# Patient Record
Sex: FEMALE | Race: WHITE | Employment: STUDENT | URBAN - NONMETROPOLITAN AREA
[De-identification: names, ages, dates, MRNs, and addresses within clinical notes are randomized per-mention and may not be internally consistent; named-entity substitution may affect disease eponyms.]

---
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
## 2022-11-14 ENCOUNTER — HOSPITAL ENCOUNTER (EMERGENCY)
Facility: HOSPITAL | Age: 22
End: 2022-11-15
Attending: EMERGENCY MEDICINE

## 2022-11-14 DIAGNOSIS — R45.851 SUICIDAL IDEATION: Primary | ICD-10-CM

## 2022-11-14 DIAGNOSIS — F32.A DEPRESSION, UNSPECIFIED DEPRESSION TYPE: ICD-10-CM

## 2022-11-14 LAB
ALBUMIN SERPL BCP-MCNC: 3.8 G/DL (ref 3.5–5)
ALP SERPL-CCNC: 91 U/L (ref 46–116)
ALT SERPL W P-5'-P-CCNC: 21 U/L (ref 12–78)
AMPHETAMINES SERPL QL SCN: NEGATIVE
ANION GAP SERPL CALCULATED.3IONS-SCNC: 6 MMOL/L (ref 4–13)
AST SERPL W P-5'-P-CCNC: 14 U/L (ref 5–45)
BARBITURATES UR QL: NEGATIVE
BASOPHILS # BLD AUTO: 0.05 THOUSANDS/ÂΜL (ref 0–0.1)
BASOPHILS NFR BLD AUTO: 0 % (ref 0–1)
BENZODIAZ UR QL: POSITIVE
BILIRUB SERPL-MCNC: 0.23 MG/DL (ref 0.2–1)
BUN SERPL-MCNC: 15 MG/DL (ref 5–25)
CALCIUM SERPL-MCNC: 9.4 MG/DL (ref 8.3–10.1)
CHLORIDE SERPL-SCNC: 104 MMOL/L (ref 96–108)
CO2 SERPL-SCNC: 30 MMOL/L (ref 21–32)
COCAINE UR QL: NEGATIVE
CREAT SERPL-MCNC: 0.82 MG/DL (ref 0.6–1.3)
EOSINOPHIL # BLD AUTO: 0.18 THOUSAND/ÂΜL (ref 0–0.61)
EOSINOPHIL NFR BLD AUTO: 2 % (ref 0–6)
ERYTHROCYTE [DISTWIDTH] IN BLOOD BY AUTOMATED COUNT: 14.3 % (ref 11.6–15.1)
ETHANOL SERPL-MCNC: <3 MG/DL (ref 0–3)
FLUAV RNA RESP QL NAA+PROBE: NEGATIVE
FLUBV RNA RESP QL NAA+PROBE: NEGATIVE
GFR SERPL CREATININE-BSD FRML MDRD: 103 ML/MIN/1.73SQ M
GLUCOSE SERPL-MCNC: 91 MG/DL (ref 65–140)
HCT VFR BLD AUTO: 38.7 % (ref 34.8–46.1)
HGB BLD-MCNC: 12.6 G/DL (ref 11.5–15.4)
IMM GRANULOCYTES # BLD AUTO: 0.07 THOUSAND/UL (ref 0–0.2)
IMM GRANULOCYTES NFR BLD AUTO: 1 % (ref 0–2)
LYMPHOCYTES # BLD AUTO: 3.45 THOUSANDS/ÂΜL (ref 0.6–4.47)
LYMPHOCYTES NFR BLD AUTO: 29 % (ref 14–44)
MCH RBC QN AUTO: 28.4 PG (ref 26.8–34.3)
MCHC RBC AUTO-ENTMCNC: 32.6 G/DL (ref 31.4–37.4)
MCV RBC AUTO: 87 FL (ref 82–98)
METHADONE UR QL: NEGATIVE
MONOCYTES # BLD AUTO: 0.59 THOUSAND/ÂΜL (ref 0.17–1.22)
MONOCYTES NFR BLD AUTO: 5 % (ref 4–12)
NEUTROPHILS # BLD AUTO: 7.54 THOUSANDS/ÂΜL (ref 1.85–7.62)
NEUTS SEG NFR BLD AUTO: 63 % (ref 43–75)
NRBC BLD AUTO-RTO: 0 /100 WBCS
OPIATES UR QL SCN: NEGATIVE
OXYCODONE+OXYMORPHONE UR QL SCN: NEGATIVE
PCP UR QL: NEGATIVE
PLATELET # BLD AUTO: 362 THOUSANDS/UL (ref 149–390)
PMV BLD AUTO: 8.6 FL (ref 8.9–12.7)
POTASSIUM SERPL-SCNC: 4.3 MMOL/L (ref 3.5–5.3)
PROT SERPL-MCNC: 7.5 G/DL (ref 6.4–8.4)
RBC # BLD AUTO: 4.44 MILLION/UL (ref 3.81–5.12)
RSV RNA RESP QL NAA+PROBE: NEGATIVE
SARS-COV-2 RNA RESP QL NAA+PROBE: NEGATIVE
SODIUM SERPL-SCNC: 140 MMOL/L (ref 135–147)
THC UR QL: POSITIVE
WBC # BLD AUTO: 11.88 THOUSAND/UL (ref 4.31–10.16)

## 2022-11-14 RX ORDER — AMOXICILLIN 250 MG
1 CAPSULE ORAL DAILY PRN
Status: CANCELLED | OUTPATIENT
Start: 2022-11-14

## 2022-11-14 RX ORDER — LANOLIN ALCOHOL/MO/W.PET/CERES
3 CREAM (GRAM) TOPICAL
Status: CANCELLED | OUTPATIENT
Start: 2022-11-14

## 2022-11-14 RX ORDER — BISACODYL 10 MG
10 SUPPOSITORY, RECTAL RECTAL DAILY PRN
Status: CANCELLED | OUTPATIENT
Start: 2022-11-14

## 2022-11-14 RX ORDER — LORAZEPAM 2 MG/ML
2 INJECTION INTRAMUSCULAR EVERY 6 HOURS PRN
Status: CANCELLED | OUTPATIENT
Start: 2022-11-14

## 2022-11-14 RX ORDER — ACETAMINOPHEN 325 MG/1
975 TABLET ORAL EVERY 6 HOURS PRN
Status: CANCELLED | OUTPATIENT
Start: 2022-11-14

## 2022-11-14 RX ORDER — TRAZODONE HYDROCHLORIDE 50 MG/1
50 TABLET ORAL
Status: CANCELLED | OUTPATIENT
Start: 2022-11-14

## 2022-11-14 RX ORDER — HALOPERIDOL 5 MG/ML
5 INJECTION INTRAMUSCULAR
Status: CANCELLED | OUTPATIENT
Start: 2022-11-14

## 2022-11-14 RX ORDER — ACETAMINOPHEN 325 MG/1
650 TABLET ORAL EVERY 4 HOURS PRN
Status: CANCELLED | OUTPATIENT
Start: 2022-11-14

## 2022-11-14 RX ORDER — NICOTINE 21 MG/24HR
14 PATCH, TRANSDERMAL 24 HOURS TRANSDERMAL DAILY
Status: DISCONTINUED | OUTPATIENT
Start: 2022-11-14 | End: 2022-11-15 | Stop reason: HOSPADM

## 2022-11-14 RX ORDER — HALOPERIDOL 1 MG/1
1 TABLET ORAL EVERY 6 HOURS PRN
Status: CANCELLED | OUTPATIENT
Start: 2022-11-14

## 2022-11-14 RX ORDER — ACETAMINOPHEN 325 MG/1
650 TABLET ORAL ONCE
Status: COMPLETED | OUTPATIENT
Start: 2022-11-14 | End: 2022-11-14

## 2022-11-14 RX ORDER — BENZTROPINE MESYLATE 1 MG/1
1 TABLET ORAL
Status: CANCELLED | OUTPATIENT
Start: 2022-11-14

## 2022-11-14 RX ORDER — HALOPERIDOL 5 MG/1
5 TABLET ORAL
Status: CANCELLED | OUTPATIENT
Start: 2022-11-14

## 2022-11-14 RX ORDER — DIPHENHYDRAMINE HYDROCHLORIDE 50 MG/ML
50 INJECTION INTRAMUSCULAR; INTRAVENOUS EVERY 6 HOURS PRN
Status: CANCELLED | OUTPATIENT
Start: 2022-11-14

## 2022-11-14 RX ORDER — HYDROXYZINE HYDROCHLORIDE 25 MG/1
25 TABLET, FILM COATED ORAL
Status: CANCELLED | OUTPATIENT
Start: 2022-11-14

## 2022-11-14 RX ORDER — LORAZEPAM 2 MG/ML
2 INJECTION INTRAMUSCULAR
Status: CANCELLED | OUTPATIENT
Start: 2022-11-14

## 2022-11-14 RX ORDER — HYDROXYZINE HYDROCHLORIDE 25 MG/1
50 TABLET, FILM COATED ORAL
Status: CANCELLED | OUTPATIENT
Start: 2022-11-14

## 2022-11-14 RX ORDER — MAGNESIUM HYDROXIDE/ALUMINUM HYDROXICE/SIMETHICONE 120; 1200; 1200 MG/30ML; MG/30ML; MG/30ML
30 SUSPENSION ORAL EVERY 4 HOURS PRN
Status: CANCELLED | OUTPATIENT
Start: 2022-11-14

## 2022-11-14 RX ORDER — POLYETHYLENE GLYCOL 3350 17 G/17G
17 POWDER, FOR SOLUTION ORAL DAILY PRN
Status: CANCELLED | OUTPATIENT
Start: 2022-11-14

## 2022-11-14 RX ORDER — HYDROXYZINE HYDROCHLORIDE 25 MG/1
100 TABLET, FILM COATED ORAL
Status: CANCELLED | OUTPATIENT
Start: 2022-11-14

## 2022-11-14 RX ORDER — LORAZEPAM 2 MG/ML
1 INJECTION INTRAMUSCULAR
Status: CANCELLED | OUTPATIENT
Start: 2022-11-14

## 2022-11-14 RX ORDER — HALOPERIDOL 5 MG/ML
2.5 INJECTION INTRAMUSCULAR
Status: CANCELLED | OUTPATIENT
Start: 2022-11-14

## 2022-11-14 RX ORDER — HALOPERIDOL 5 MG/1
2.5 TABLET ORAL
Status: CANCELLED | OUTPATIENT
Start: 2022-11-14

## 2022-11-14 RX ORDER — ACETAMINOPHEN 325 MG/1
650 TABLET ORAL EVERY 6 HOURS PRN
Status: CANCELLED | OUTPATIENT
Start: 2022-11-14

## 2022-11-14 RX ORDER — BENZTROPINE MESYLATE 1 MG/ML
0.5 INJECTION INTRAMUSCULAR; INTRAVENOUS
Status: CANCELLED | OUTPATIENT
Start: 2022-11-14

## 2022-11-14 RX ORDER — BENZTROPINE MESYLATE 1 MG/ML
1 INJECTION INTRAMUSCULAR; INTRAVENOUS
Status: CANCELLED | OUTPATIENT
Start: 2022-11-14

## 2022-11-14 RX ADMIN — ACETAMINOPHEN 650 MG: 325 TABLET, FILM COATED ORAL at 16:57

## 2022-11-14 RX ADMIN — NICOTINE 14 MG: 14 PATCH, EXTENDED RELEASE TRANSDERMAL at 18:00

## 2022-11-14 NOTE — ED PROVIDER NOTES
History  Chief Complaint   Patient presents with   • Psychiatric Evaluation     Presents due to anxiety and depression recently, intermittent SI with no plan, previous attempts in last 3 months "cutting self and punching myself ill do anything to myself"     27-year-old female without pertinent history who presents to the emergency department for anxiety and depression recently and intermittent SI with no plan  Reports previous attempts in the last 3 months with cutting herself and punching herself  Patient reports she has not had any SI today but did have SI recently day before yesterday  States that when she does have these thoughts she tries to harm herself  States that she has jumped out of a car in the past and has also banged her head against the wall  Reports a mild headache at this time from hitting herself yesterday  Reports that she has also cut herself in the past but nothing recently  Patient states that she is interested in inpatient psychiatry evaluation and management as she stop using her previous medications  Reports no HI or hallucinations  Denies any physical complaints otherwise  None       History reviewed  No pertinent past medical history  History reviewed  No pertinent surgical history  History reviewed  No pertinent family history  I have reviewed and agree with the history as documented  E-Cigarette/Vaping     E-Cigarette/Vaping Substances   • Nicotine Yes    • Flavoring Yes      Social History     Tobacco Use   • Smoking status: Current Every Day Smoker     Types: Cigarettes   • Smokeless tobacco: Never Used   Substance Use Topics   • Alcohol use: Yes     Comment: social   • Drug use: Yes     Types: Marijuana       Review of Systems   Constitutional: Negative for activity change, appetite change, chills and fever  HENT: Negative for congestion, rhinorrhea and sore throat  Eyes: Negative for visual disturbance     Respiratory: Negative for chest tightness, shortness of breath and wheezing  Cardiovascular: Negative for chest pain, palpitations and leg swelling  Gastrointestinal: Negative for abdominal pain, constipation, diarrhea, nausea and vomiting  Genitourinary: Negative for dysuria, flank pain and pelvic pain  Musculoskeletal: Negative for back pain and neck pain  Skin: Negative for rash  Neurological: Negative for weakness, numbness and headaches  Psychiatric/Behavioral: Positive for self-injury and suicidal ideas (Intermittently but not currently)  Negative for agitation, behavioral problems, hallucinations and sleep disturbance  The patient is not nervous/anxious  Physical Exam  Physical Exam  Vitals and nursing note reviewed  Constitutional:       General: She is not in acute distress  Appearance: She is well-developed  She is not diaphoretic  HENT:      Head: Normocephalic and atraumatic  Right Ear: External ear normal       Left Ear: External ear normal       Nose: Nose normal    Eyes:      Pupils: Pupils are equal, round, and reactive to light  Cardiovascular:      Rate and Rhythm: Normal rate and regular rhythm  Pulses: Normal pulses  Heart sounds: Normal heart sounds  Pulmonary:      Effort: Pulmonary effort is normal  No respiratory distress  Breath sounds: Normal breath sounds  No wheezing or rales  Abdominal:      General: Bowel sounds are normal       Palpations: Abdomen is soft  Tenderness: There is no abdominal tenderness  Musculoskeletal:         General: No tenderness or deformity  Normal range of motion  Cervical back: Normal range of motion and neck supple  Skin:     General: Skin is warm and dry  Capillary Refill: Capillary refill takes less than 2 seconds  Neurological:      Mental Status: She is alert and oriented to person, place, and time  Motor: No abnormal muscle tone     Psychiatric:      Comments: Denies active SI currently but states that she did have SI yesterday intermittently since  Denies any hallucinations or HI  Vital Signs  ED Triage Vitals [11/14/22 1239]   Temperature Pulse Respirations Blood Pressure SpO2   (!) 97 °F (36 1 °C) 102 18 145/85 98 %      Temp src Heart Rate Source Patient Position - Orthostatic VS BP Location FiO2 (%)   -- -- Sitting Right arm --      Pain Score       2           Vitals:    11/14/22 1239   BP: 145/85   Pulse: 102   Patient Position - Orthostatic VS: Sitting         Visual Acuity      ED Medications  Medications   nicotine (NICODERM CQ) 14 mg/24hr TD 24 hr patch 14 mg (has no administration in time range)   acetaminophen (TYLENOL) tablet 650 mg (650 mg Oral Given 11/14/22 1657)       Diagnostic Studies  Results Reviewed     Procedure Component Value Units Date/Time    FLU/RSV/COVID - if FLU/RSV clinically relevant [[id number]]  (Normal) Collected: 11/14/22 1416    Lab Status: Final result Specimen: Nares from Nose Updated: 11/14/22 1506     SARS-CoV-2 Negative     INFLUENZA A PCR Negative     INFLUENZA B PCR Negative     RSV PCR Negative    Narrative:      FOR PEDIATRIC PATIENTS - copy/paste COVID Guidelines URL to browser: [email or web address]  [employer]    SARS-CoV-2 assay is a Nucleic Acid Amplification assay intended for the  qualitative detection of nucleic acid from SARS-CoV-2 in nasopharyngeal  swabs  Results are for the presumptive identification of SARS-CoV-2 RNA  Positive results are indicative of infection with SARS-CoV-2, the virus  causing COVID-19, but do not rule out bacterial infection or co-infection  with other viruses  Laboratories within the [place] and its  territories are required to report all positive results to the appropriate  public health authorities  Negative results do not preclude SARS-CoV-2  infection and should not be used as the sole basis for treatment or other  patient management decisions   Negative results must be combined with  clinical observations, patient history, and epidemiological information  This test has not been FDA cleared or approved  This test has been authorized by FDA under an Emergency Use Authorization  (EUA)  This test is only authorized for the duration of time the  declaration that circumstances exist justifying the authorization of the  emergency use of an in vitro diagnostic tests for detection of SARS-CoV-2  virus and/or diagnosis of COVID-19 infection under section 564(b)(1) of  the Act, 21 U  S C  283ERG-3(J)(2), unless the authorization is terminated  or revoked sooner  The test has been validated but independent review by FDA  and CLIA is pending  Test performed using [employer] GeneXpert: This RT-PCR assay targets N2,  a region unique to SARS-CoV-2  A conserved region in the E-gene was chosen  for pan-Sarbecovirus detection which includes SARS-CoV-2  According to CMS-2020-01-R, this platform meets the definition of high-throughput technology      Ethanol [[id number]]  (Normal) Collected: 11/14/22 1416    Lab Status: Final result Specimen: Blood from Arm, Right Updated: 11/14/22 1457     Ethanol Lvl <3 mg/dL     Comprehensive metabolic panel [[id number]] Collected: 11/14/22 1416    Lab Status: Final result Specimen: Blood from Arm, Right Updated: 11/14/22 1445     Sodium 140 mmol/L      Potassium 4 3 mmol/L      Chloride 104 mmol/L      CO2 30 mmol/L      ANION GAP 6 mmol/L      BUN 15 mg/dL      Creatinine 0 82 mg/dL      Glucose 91 mg/dL      Calcium 9 4 mg/dL      AST 14 U/L      ALT 21 U/L      Alkaline Phosphatase 91 U/L      Total Protein 7 5 g/dL      Albumin 3 8 g/dL      Total Bilirubin 0 23 mg/dL      eGFR 103 ml/min/1 73sq m     Narrative:      [place] guidelines for Chronic Kidney Disease (CKD):   •  Stage 1 with normal or high GFR (GFR > 90 mL/min/1 73 square meters)  •  Stage 2 Mild CKD (GFR = 60-89 mL/min/1 73 square meters)  •  Stage 3A Moderate CKD (GFR = 45-59 mL/min/1 73 square meters)  •  Stage 3B Moderate CKD (GFR = 30-44 mL/min/1 73 square meters)  •  Stage 4 Severe CKD (GFR = 15-29 mL/min/1 73 square meters)  •  Stage 5 End Stage CKD (GFR <15 mL/min/1 73 square meters)  Note: GFR calculation is accurate only with a steady state creatinine    Rapid drug screen, urine [[id number]]  (Abnormal) Collected: 11/14/22 1421    Lab Status: Final result Specimen: Urine, Clean Catch Updated: 11/14/22 1443     Amph/Meth UR Negative     Barbiturate Ur Negative     Benzodiazepine Urine Positive     Cocaine Urine Negative     Methadone Urine Negative     Opiate Urine Negative     PCP Ur Negative     THC Urine Positive     Oxycodone Urine Negative    Narrative:      Presumptive report  If requested, specimen will be sent to reference lab for confirmation  FOR MEDICAL PURPOSES ONLY  IF CONFIRMATION NEEDED PLEASE CONTACT THE LAB WITHIN 5 DAYS      Drug Screen Cutoff Levels:  AMPHETAMINE/METHAMPHETAMINES  1000 ng/mL  BARBITURATES     200 ng/mL  BENZODIAZEPINES     200 ng/mL  COCAINE      300 ng/mL  METHADONE      300 ng/mL  OPIATES      300 ng/mL  PHENCYCLIDINE     25 ng/mL  THC       50 ng/mL  OXYCODONE      100 ng/mL    CBC and differential [[id number]]  (Abnormal) Collected: 11/14/22 1416    Lab Status: Final result Specimen: Blood from Arm, Right Updated: 11/14/22 1426     WBC 11 88 Thousand/uL      RBC 4 44 Million/uL      Hemoglobin 12 6 g/dL      Hematocrit 38 7 %      MCV 87 fL      MCH 28 4 pg      MCHC 32 6 g/dL      RDW 14 3 %      MPV 8 6 fL      Platelets 083 Thousands/uL      nRBC 0 /100 WBCs      Neutrophils Relative 63 %      Immat GRANS % 1 %      Lymphocytes Relative 29 %      Monocytes Relative 5 %      Eosinophils Relative 2 %      Basophils Relative 0 %      Neutrophils Absolute 7 54 Thousands/µL      Immature Grans Absolute 0 07 Thousand/uL      Lymphocytes Absolute 3 45 Thousands/µL      Monocytes Absolute 0 59 Thousand/µL      Eosinophils Absolute 0 18 Thousand/µL      Basophils Absolute 0 05 Thousands/µL                  No orders to display              Procedures  Procedures         ED Course          RESULTS:  Results Reviewed     Procedure Component Value Units Date/Time    FLU/RSV/COVID - if FLU/RSV clinically relevant [[id number]]  (Normal) Collected: 11/14/22 1416    Lab Status: Final result Specimen: Nares from Nose Updated: 11/14/22 1506     SARS-CoV-2 Negative     INFLUENZA A PCR Negative     INFLUENZA B PCR Negative     RSV PCR Negative    Narrative:      FOR PEDIATRIC PATIENTS - copy/paste COVID Guidelines URL to browser: [email or web address]  [employer]    SARS-CoV-2 assay is a Nucleic Acid Amplification assay intended for the  qualitative detection of nucleic acid from SARS-CoV-2 in nasopharyngeal  swabs  Results are for the presumptive identification of SARS-CoV-2 RNA  Positive results are indicative of infection with SARS-CoV-2, the virus  causing COVID-19, but do not rule out bacterial infection or co-infection  with other viruses  Laboratories within the [place] and its  territories are required to report all positive results to the appropriate  public health authorities  Negative results do not preclude SARS-CoV-2  infection and should not be used as the sole basis for treatment or other  patient management decisions  Negative results must be combined with  clinical observations, patient history, and epidemiological information  This test has not been FDA cleared or approved  This test has been authorized by FDA under an Emergency Use Authorization  (EUA)  This test is only authorized for the duration of time the  declaration that circumstances exist justifying the authorization of the  emergency use of an in vitro diagnostic tests for detection of SARS-CoV-2  virus and/or diagnosis of COVID-19 infection under section 564(b)(1) of  the Act, 21 U  S C  271AEO-1(E)(9), unless the authorization is terminated  or revoked sooner  The test has been validated but independent review by FDA  and CLIA is pending  Test performed using [employer] GeneXpert: This RT-PCR assay targets N2,  a region unique to SARS-CoV-2  A conserved region in the E-gene was chosen  for pan-Sarbecovirus detection which includes SARS-CoV-2  According to CMS-2020-01-R, this platform meets the definition of high-throughput technology      Ethanol [[id number]]  (Normal) Collected: 11/14/22 1416    Lab Status: Final result Specimen: Blood from Arm, Right Updated: 11/14/22 1457     Ethanol Lvl <3 mg/dL     Comprehensive metabolic panel [[id number]] Collected: 11/14/22 1416    Lab Status: Final result Specimen: Blood from Arm, Right Updated: 11/14/22 1445     Sodium 140 mmol/L      Potassium 4 3 mmol/L      Chloride 104 mmol/L      CO2 30 mmol/L      ANION GAP 6 mmol/L      BUN 15 mg/dL      Creatinine 0 82 mg/dL      Glucose 91 mg/dL      Calcium 9 4 mg/dL      AST 14 U/L      ALT 21 U/L      Alkaline Phosphatase 91 U/L      Total Protein 7 5 g/dL      Albumin 3 8 g/dL      Total Bilirubin 0 23 mg/dL      eGFR 103 ml/min/1 73sq m     Narrative:      [place] guidelines for Chronic Kidney Disease (CKD):   •  Stage 1 with normal or high GFR (GFR > 90 mL/min/1 73 square meters)  •  Stage 2 Mild CKD (GFR = 60-89 mL/min/1 73 square meters)  •  Stage 3A Moderate CKD (GFR = 45-59 mL/min/1 73 square meters)  •  Stage 3B Moderate CKD (GFR = 30-44 mL/min/1 73 square meters)  •  Stage 4 Severe CKD (GFR = 15-29 mL/min/1 73 square meters)  •  Stage 5 End Stage CKD (GFR <15 mL/min/1 73 square meters)  Note: GFR calculation is accurate only with a steady state creatinine    Rapid drug screen, urine [[id number]]  (Abnormal) Collected: 11/14/22 1421    Lab Status: Final result Specimen: Urine, Clean Catch Updated: 11/14/22 1443     Amph/Meth UR Negative     Barbiturate Ur Negative     Benzodiazepine Urine Positive     Cocaine Urine Negative     Methadone Urine Negative     Opiate Urine Negative     PCP Ur Negative     THC Urine Positive     Oxycodone Urine Negative    Narrative:      Presumptive report  If requested, specimen will be sent to reference lab for confirmation  FOR MEDICAL PURPOSES ONLY  IF CONFIRMATION NEEDED PLEASE CONTACT THE LAB WITHIN 5 DAYS  Drug Screen Cutoff Levels:  AMPHETAMINE/METHAMPHETAMINES  1000 ng/mL  BARBITURATES     200 ng/mL  BENZODIAZEPINES     200 ng/mL  COCAINE      300 ng/mL  METHADONE      300 ng/mL  OPIATES      300 ng/mL  PHENCYCLIDINE     25 ng/mL  THC       50 ng/mL  OXYCODONE      100 ng/mL    CBC and differential [[id number]]  (Abnormal) Collected: 11/14/22 1416    Lab Status: Final result Specimen: Blood from Arm, Right Updated: 11/14/22 1426     WBC 11 88 Thousand/uL      RBC 4 44 Million/uL      Hemoglobin 12 6 g/dL      Hematocrit 38 7 %      MCV 87 fL      MCH 28 4 pg      MCHC 32 6 g/dL      RDW 14 3 %      MPV 8 6 fL      Platelets 935 Thousands/uL      nRBC 0 /100 WBCs      Neutrophils Relative 63 %      Immat GRANS % 1 %      Lymphocytes Relative 29 %      Monocytes Relative 5 %      Eosinophils Relative 2 %      Basophils Relative 0 %      Neutrophils Absolute 7 54 Thousands/µL      Immature Grans Absolute 0 07 Thousand/uL      Lymphocytes Absolute 3 45 Thousands/µL      Monocytes Absolute 0 59 Thousand/µL      Eosinophils Absolute 0 18 Thousand/µL      Basophils Absolute 0 05 Thousands/µL           No orders to display       Vitals:    11/14/22 1239   BP: 145/85   Pulse: 102   Resp: 18   Patient Position - Orthostatic VS: Sitting   Temp: (!) 97 °F (36 1 °C)         MDM  Number of Diagnoses or Management Options  Depression, unspecified depression type  Suicidal ideation  Diagnosis management comments: 61-year-old female without pertinent history who presents to the emergency department for anxiety and depression recently and intermittent SI with no plan    Vital signs here notable for mild tachycardia with heart rate in the low 100s  Otherwise vital signs were non concerning  Patient did endorse intermittent SI with signs of depression/anxiety  Patient has been off her medications  Lab work obtained per psychiatric protocol  Lab work returned showing no immediate concerns except for positive benzos and THC  Mild leukocytosis noted at 11 8  Patient was medically cleared  Crisis contacted for evaluation  Recommended that patient have 201 signed  Patient signed and bed search initiated  Nicotine patch ordered at patient's request   Patient signed out to the next attending physician, Dr Mandie Nicholson, at 2100  Amount and/or Complexity of Data Reviewed  Clinical lab tests: ordered and reviewed  Obtain history from someone other than the patient: yes  Review and summarize past medical records: yes    Risk of Complications, Morbidity, and/or Mortality  Presenting problems: moderate  Diagnostic procedures: moderate  Management options: moderate        Disposition  Final diagnoses:   Suicidal ideation   Depression, unspecified depression type     Time reflects when diagnosis was documented in both MDM as applicable and the Disposition within this note     Time User Action Codes Description Comment    11/14/2022  2:00 PM Waynette Lipoma Add [[id number] [id number]] Suicidal ideation     11/14/2022  2:00 PM Waynette Lipoma Add Erick CORDERO] Depression, unspecified depression type       ED Disposition     None      Follow-up Information    None         Patient's Medications    No medications on file       No discharge procedures on file      PDMP Review     None          ED Provider  Electronically Signed by           Greg Cat MD  11/14/22 0654

## 2022-11-14 NOTE — ED NOTES
Crisis worker did evaluation prior to patient presenting to ED  Will fax over        Bonita Tinsley RN  11/14/22 3961

## 2022-11-15 ENCOUNTER — HOSPITAL ENCOUNTER (INPATIENT)
Facility: HOSPITAL | Age: 22
LOS: 3 days | Discharge: HOME | End: 2022-11-18
Attending: STUDENT IN AN ORGANIZED HEALTH CARE EDUCATION/TRAINING PROGRAM | Admitting: PSYCHIATRY & NEUROLOGY

## 2022-11-15 VITALS
RESPIRATION RATE: 20 BRPM | WEIGHT: 130 LBS | DIASTOLIC BLOOD PRESSURE: 81 MMHG | OXYGEN SATURATION: 100 % | TEMPERATURE: 97.3 F | HEART RATE: 94 BPM | SYSTOLIC BLOOD PRESSURE: 118 MMHG

## 2022-11-15 DIAGNOSIS — F33.9 RECURRENT MAJOR DEPRESSIVE DISORDER (HCC): Primary | ICD-10-CM

## 2022-11-15 DIAGNOSIS — R45.851 SUICIDAL IDEATION: ICD-10-CM

## 2022-11-15 PROBLEM — F43.10 PTSD (POST-TRAUMATIC STRESS DISORDER): Status: ACTIVE | Noted: 2022-11-15

## 2022-11-15 PROBLEM — F41.9 ANXIETY: Status: ACTIVE | Noted: 2022-11-15

## 2022-11-15 PROBLEM — F32.81 PMDD (PREMENSTRUAL DYSPHORIC DISORDER): Status: ACTIVE | Noted: 2022-11-15

## 2022-11-15 PROBLEM — F31.9 BIPOLAR DISORDER (HCC): Status: ACTIVE | Noted: 2022-11-15

## 2022-11-15 LAB
FLUAV RNA RESP QL NAA+PROBE: NEGATIVE
FLUBV RNA RESP QL NAA+PROBE: NEGATIVE
RSV RNA RESP QL NAA+PROBE: NEGATIVE
SARS-COV-2 RNA RESP QL NAA+PROBE: NEGATIVE

## 2022-11-15 PROCEDURE — GZ59ZZZ INDIVIDUAL PSYCHOTHERAPY, PSYCHOPHYSIOLOGICAL: ICD-10-PCS | Performed by: STUDENT IN AN ORGANIZED HEALTH CARE EDUCATION/TRAINING PROGRAM

## 2022-11-15 PROCEDURE — GZHZZZZ GROUP PSYCHOTHERAPY: ICD-10-PCS | Performed by: STUDENT IN AN ORGANIZED HEALTH CARE EDUCATION/TRAINING PROGRAM

## 2022-11-15 RX ORDER — HALOPERIDOL 1 MG/1
1 TABLET ORAL EVERY 6 HOURS PRN
Status: DISCONTINUED | OUTPATIENT
Start: 2022-11-15 | End: 2022-11-18 | Stop reason: HOSPADM

## 2022-11-15 RX ORDER — BENZTROPINE MESYLATE 1 MG/1
1 TABLET ORAL
Status: DISCONTINUED | OUTPATIENT
Start: 2022-11-15 | End: 2022-11-18 | Stop reason: HOSPADM

## 2022-11-15 RX ORDER — ACETAMINOPHEN 325 MG/1
650 TABLET ORAL EVERY 6 HOURS PRN
Status: DISCONTINUED | OUTPATIENT
Start: 2022-11-15 | End: 2022-11-18 | Stop reason: HOSPADM

## 2022-11-15 RX ORDER — LANOLIN ALCOHOL/MO/W.PET/CERES
3 CREAM (GRAM) TOPICAL
Status: DISCONTINUED | OUTPATIENT
Start: 2022-11-15 | End: 2022-11-18 | Stop reason: HOSPADM

## 2022-11-15 RX ORDER — BISACODYL 10 MG
10 SUPPOSITORY, RECTAL RECTAL DAILY PRN
Status: DISCONTINUED | OUTPATIENT
Start: 2022-11-15 | End: 2022-11-18 | Stop reason: HOSPADM

## 2022-11-15 RX ORDER — LORAZEPAM 2 MG/ML
2 INJECTION INTRAMUSCULAR EVERY 6 HOURS PRN
Status: DISCONTINUED | OUTPATIENT
Start: 2022-11-15 | End: 2022-11-18 | Stop reason: HOSPADM

## 2022-11-15 RX ORDER — HYDROXYZINE 50 MG/1
100 TABLET, FILM COATED ORAL
Status: DISCONTINUED | OUTPATIENT
Start: 2022-11-15 | End: 2022-11-18 | Stop reason: HOSPADM

## 2022-11-15 RX ORDER — HALOPERIDOL 5 MG/1
2.5 TABLET ORAL
Status: DISCONTINUED | OUTPATIENT
Start: 2022-11-15 | End: 2022-11-18 | Stop reason: HOSPADM

## 2022-11-15 RX ORDER — LORAZEPAM 2 MG/ML
2 INJECTION INTRAMUSCULAR
Status: DISCONTINUED | OUTPATIENT
Start: 2022-11-15 | End: 2022-11-18 | Stop reason: HOSPADM

## 2022-11-15 RX ORDER — BENZTROPINE MESYLATE 1 MG/ML
0.5 INJECTION INTRAMUSCULAR; INTRAVENOUS
Status: DISCONTINUED | OUTPATIENT
Start: 2022-11-15 | End: 2022-11-18 | Stop reason: HOSPADM

## 2022-11-15 RX ORDER — BISACODYL 10 MG
10 SUPPOSITORY, RECTAL RECTAL DAILY PRN
Status: DISCONTINUED | OUTPATIENT
Start: 2022-11-15 | End: 2022-11-15

## 2022-11-15 RX ORDER — LORAZEPAM 2 MG/ML
1 INJECTION INTRAMUSCULAR
Status: DISCONTINUED | OUTPATIENT
Start: 2022-11-15 | End: 2022-11-18 | Stop reason: HOSPADM

## 2022-11-15 RX ORDER — TRAZODONE HYDROCHLORIDE 50 MG/1
50 TABLET ORAL
Status: DISCONTINUED | OUTPATIENT
Start: 2022-11-15 | End: 2022-11-18 | Stop reason: HOSPADM

## 2022-11-15 RX ORDER — HALOPERIDOL 5 MG/ML
5 INJECTION INTRAMUSCULAR
Status: DISCONTINUED | OUTPATIENT
Start: 2022-11-15 | End: 2022-11-18 | Stop reason: HOSPADM

## 2022-11-15 RX ORDER — ARIPIPRAZOLE 5 MG/1
5 TABLET ORAL DAILY
Status: DISCONTINUED | OUTPATIENT
Start: 2022-11-15 | End: 2022-11-18 | Stop reason: HOSPADM

## 2022-11-15 RX ORDER — POLYETHYLENE GLYCOL 3350 17 G/17G
17 POWDER, FOR SOLUTION ORAL DAILY PRN
Status: DISCONTINUED | OUTPATIENT
Start: 2022-11-15 | End: 2022-11-15

## 2022-11-15 RX ORDER — VENLAFAXINE HYDROCHLORIDE 37.5 MG/1
37.5 CAPSULE, EXTENDED RELEASE ORAL DAILY
Status: DISCONTINUED | OUTPATIENT
Start: 2022-11-15 | End: 2022-11-16

## 2022-11-15 RX ORDER — DIPHENHYDRAMINE HYDROCHLORIDE 50 MG/ML
50 INJECTION INTRAMUSCULAR; INTRAVENOUS EVERY 6 HOURS PRN
Status: DISCONTINUED | OUTPATIENT
Start: 2022-11-15 | End: 2022-11-18 | Stop reason: HOSPADM

## 2022-11-15 RX ORDER — HALOPERIDOL 5 MG/1
5 TABLET ORAL
Status: DISCONTINUED | OUTPATIENT
Start: 2022-11-15 | End: 2022-11-18 | Stop reason: HOSPADM

## 2022-11-15 RX ORDER — HYDROXYZINE HYDROCHLORIDE 25 MG/1
25 TABLET, FILM COATED ORAL
Status: DISCONTINUED | OUTPATIENT
Start: 2022-11-15 | End: 2022-11-18 | Stop reason: HOSPADM

## 2022-11-15 RX ORDER — HYDROXYZINE 50 MG/1
50 TABLET, FILM COATED ORAL
Status: DISCONTINUED | OUTPATIENT
Start: 2022-11-15 | End: 2022-11-18 | Stop reason: HOSPADM

## 2022-11-15 RX ORDER — HALOPERIDOL 5 MG/ML
2.5 INJECTION INTRAMUSCULAR
Status: DISCONTINUED | OUTPATIENT
Start: 2022-11-15 | End: 2022-11-18 | Stop reason: HOSPADM

## 2022-11-15 RX ORDER — ACETAMINOPHEN 325 MG/1
650 TABLET ORAL EVERY 4 HOURS PRN
Status: DISCONTINUED | OUTPATIENT
Start: 2022-11-15 | End: 2022-11-18 | Stop reason: HOSPADM

## 2022-11-15 RX ORDER — MAGNESIUM HYDROXIDE/ALUMINUM HYDROXICE/SIMETHICONE 120; 1200; 1200 MG/30ML; MG/30ML; MG/30ML
30 SUSPENSION ORAL EVERY 4 HOURS PRN
Status: DISCONTINUED | OUTPATIENT
Start: 2022-11-15 | End: 2022-11-18 | Stop reason: HOSPADM

## 2022-11-15 RX ORDER — AMOXICILLIN 250 MG
1 CAPSULE ORAL DAILY PRN
Status: DISCONTINUED | OUTPATIENT
Start: 2022-11-15 | End: 2022-11-18 | Stop reason: HOSPADM

## 2022-11-15 RX ORDER — BENZTROPINE MESYLATE 1 MG/ML
1 INJECTION INTRAMUSCULAR; INTRAVENOUS
Status: DISCONTINUED | OUTPATIENT
Start: 2022-11-15 | End: 2022-11-18 | Stop reason: HOSPADM

## 2022-11-15 RX ORDER — ACETAMINOPHEN 325 MG/1
975 TABLET ORAL EVERY 6 HOURS PRN
Status: DISCONTINUED | OUTPATIENT
Start: 2022-11-15 | End: 2022-11-18 | Stop reason: HOSPADM

## 2022-11-15 RX ORDER — POLYETHYLENE GLYCOL 3350 17 G/17G
17 POWDER, FOR SOLUTION ORAL DAILY PRN
Status: DISCONTINUED | OUTPATIENT
Start: 2022-11-15 | End: 2022-11-18 | Stop reason: HOSPADM

## 2022-11-15 RX ADMIN — VENLAFAXINE HYDROCHLORIDE 37.5 MG: 37.5 CAPSULE, EXTENDED RELEASE ORAL at 17:53

## 2022-11-15 RX ADMIN — NICOTINE 14 MG: 14 PATCH, EXTENDED RELEASE TRANSDERMAL at 09:07

## 2022-11-15 RX ADMIN — ARIPIPRAZOLE 5 MG: 5 TABLET ORAL at 17:53

## 2022-11-15 RX ADMIN — NICOTINE POLACRILEX 2 MG: 4 GUM, CHEWING BUCCAL at 21:22

## 2022-11-15 RX ADMIN — Medication 3 MG: at 21:22

## 2022-11-15 RX ADMIN — INFLUENZA VIRUS VACCINE 0.5 ML: 15; 15; 15; 15 SUSPENSION INTRAMUSCULAR at 17:46
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
## 2022-11-15 NOTE — PROGRESS NOTES
Pt belongings upon arrival;  -(4) Pants  -(7) Shirts  -(5) Underwear  -Socks  -Shoes  -Boy shorts  -(6) Vapes  -Vape   -(8) Earrings  -Pair gages  -Cellphone  -Fleece jacket  -Bedazzled backpack  -Pink Bookbag   -[place]od w/case  -(2) Chargers  -Tweezers  -$14 51 IN CHANGE  -Vaseline  -Vape Pen  -Shades  -Eye Shadow  -Brown cloth wallet  -(2) Marijuana Cards  -(2) Vianney (9902)  -Pa ID (6431)  -Visa (3014)  -[place] (2923)  -(2) SSCs ([phone])([phone])    Bedside belongings;  -(3) Shirts  -(2) Pants  -(3) Underwear  -Socks

## 2022-11-15 NOTE — PLAN OF CARE
Pt newly admitted        Problem: Depression  Goal: Treatment Goal: Demonstrate behavioral control of depressive symptoms, verbalize feelings of improved mood/affect, and adopt new coping skills prior to discharge  Outcome: Progressing

## 2022-11-15 NOTE — CMS CERTIFICATION NOTE
Recertification: Based upon physical, mental and social evaluations, I certify that inpatient psychiatric services continue to be medically necessary for this patient for a duration of 7 midnights for the treatment of  Recurrent major depressive disorder ([facility] [address] )   Available alternative community resources still do not meet the patient's mental health care needs  I further attest that an established written individualized plan of care has been updated and is outlined in the patient's medical records

## 2022-11-15 NOTE — ED NOTES
Pt accepted at [facility] by Dr Nay He  [facility]

## 2022-11-15 NOTE — ED NOTES
Patient is accepted at [facility]  Patient is accepted by Dr Marija Moeller per Intake  Transportation is arranged with [facility]  Transportation is scheduled for 1230  Patient may go to the floor after 1330      Nurse report is to be called to [phone] prior to patient transfer

## 2022-11-15 NOTE — ED NOTES
Insurance Authorization for admission:   Phone call placed to [facility]  Phone number:  [phone]  Spoke to Bonnie Manzanares   14 days approved    Level of care: 201  Review on 11/28/22  Authorization # [id number]

## 2022-11-15 NOTE — TREATMENT PLAN
TREATMENT PLAN REVIEW - [address] 23 y o  2000 female MRN: [id number]    [address] [place] [address] Room / Bed: [facility] 254/[facility] 789-24 Encounter: [id number]          Admit Date/Time:  11/15/2022  1:19 PM    Treatment Team: Attending Provider: Asia Hernandez MD; Registered Nurse: Reginald Goodell, RN    Diagnosis: Principal Problem:    Recurrent major depressive disorder ([facility] )  Active Problems:    Bipolar disorder ([facility] )    PMDD (premenstrual dysphoric disorder)    Anxiety    PTSD (post-traumatic stress disorder)      Patient Strengths/Assets: cooperative, motivation for treatment/growth, patient is on a voluntary commitment    Patient Barriers/Limitations: marital/family conflict, noncompliant with medication, noncompliant with treatment    Short Term Goals: decrease in depressive symptoms, decrease in anxiety symptoms, decrease in paranoid thoughts, decrease in suicidal thoughts, decrease in self abusive behaviors, improvement in insight, sleep improvement, improvement in appetite, mood stabilization    Long Term Goals: improvement in depression, improvement in anxiety, resolution of depressive symptoms, resolution of manic symptoms, stabilization of mood, free of suicidal thoughts, no self abusive behavior, adequate self care, adequate sleep, adequate appetite    Progress Towards Goals: starting psychiatric medications as prescribed    Recommended Treatment: medication management, patient medication education, group therapy, milieu therapy, continued Behavioral Health psychiatric evaluation/assessment process    Treatment Frequency: daily medication monitoring, group and milieu therapy daily, monitoring through interdisciplinary rounds, monitoring through weekly patient care conferences    Expected Discharge Date:  5-7 days    Discharge Plan: referral for outpatient medication management with a psychiatrist, referral for outpatient psychotherapy    Treatment Plan Created/Updated By: Mari Rdz MD
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
## 2022-11-15 NOTE — EMTALA/ACUTE CARE TRANSFER
[address] [place], [place] [address] [address]  [facility] [address]  Dept: [phone]      EMTALA TRANSFER CONSENT    NAME Daryle Prudent DOB 2000                              MRN [id number]    I have been informed of my rights regarding examination, treatment, and transfer   by Dr Lester Quan MD    Benefits: Other benefits (Include comment)_______________________ (behavioral health)    Risks: Potential for delay in receiving treatment      Consent for Transfer:  I acknowledge that my medical condition has been evaluated and explained to me by the emergency department physician or other qualified medical person and/or my attending physician, who has recommended that I be transferred to the service of  Accepting Physician: Dr Lacie Solano at [address] [place] [address] Name, [address] : [address]  The above potential benefits of such transfer, the potential risks associated with such transfer, and the probable risks of not being transferred have been explained to me, and I fully understand them  The doctor has explained that, in my case, the benefits of transfer outweigh the risks  I agree to be transferred  I authorize the performance of emergency medical procedures and treatments upon me in both transit and upon arrival at the receiving facility  Additionally, I authorize the release of any and all medical records to the receiving facility and request they be transported with me, if possible  I understand that the safest mode of transportation during a medical emergency is an ambulance and that the Hospital advocates the use of this mode of transport  Risks of traveling to the receiving facility by car, including absence of medical control, life sustaining equipment, such as oxygen, and medical personnel has been explained to me and I fully understand them      (NICOLASA CORRECT BOX BELOW)  [X]  I consent to the stated transfer and to be transported by ambulance/helicopter  [  ]  I consent to the stated transfer, but refuse transportation by ambulance and accept full responsibility for my transportation by car  I understand the risks of non-ambulance transfers and I exonerate the Hospital and its staff from any deterioration in my condition that results from this refusal     X___________________________________________    DATE  11/15/22  TIME________  Signature of patient or legally responsible individual signing on patient behalf           RELATIONSHIP TO PATIENT_________________________          Provider Certification    NAME Autumn Tabor                                         2000                              MRN [id number]    A medical screening exam was performed on the above named patient  Based on the examination:    Condition Necessitating Transfer The primary encounter diagnosis was Suicidal ideation  A diagnosis of Depression, unspecified depression type was also pertinent to this visit  Patient Condition: The patient has been stabilized such that within reasonable medical probability, no material deterioration of the patient condition or the condition of the unborn child(aric) is likely to result from the transfer    Reason for Transfer: Level of Care needed not available at this facility    Transfer Requirements: [address]   · Space available and qualified personnel available for treatment as acknowledged by Crisis  · Agreed to accept transfer and to provide appropriate medical treatment as acknowledged by       Dr Dalia Mazariegos  · Appropriate medical records of the examination and treatment of the patient are provided at the time of transfer   [facility], [address] _______  · Transfer will be performed by qualified personnel from [address] [place] [address]  and appropriate transfer equipment as required, including the use of necessary and appropriate life support measures      Provider Certification: I have examined the patient and explained the following risks and benefits of being transferred/refusing transfer to the patient/family:  General risk, such as traffic hazards, adverse weather conditions, rough terrain or turbulence, possible failure of equipment (including vehicle or aircraft), or consequences of actions of persons outside the control of the transport personnel, The patient is stable for psychiatric transfer because they are medically stable, and is protected from harming him/herself or others during transport      Based on these reasonable risks and benefits to the patient and/or the unborn child(aric), and based upon the information available at the time of the patient’s examination, I certify that the medical benefits reasonably to be expected from the provision of appropriate medical treatments at another medical facility outweigh the increasing risks, if any, to the individual’s medical condition, and in the case of labor to the unborn child, from effecting the transfer      X____________________________________________ DATE 11/15/22        TIME_______      ORIGINAL - SEND TO MEDICAL RECORDS   COPY - SEND WITH PATIENT DURING TRANSFER

## 2022-11-15 NOTE — ED NOTES
Pt being reviewed by [facility] for admission  Waiting call back from [facility] to complete insurance pre-cert

## 2022-11-16 PROBLEM — Z00.8 MEDICAL CLEARANCE FOR PSYCHIATRIC ADMISSION: Status: ACTIVE | Noted: 2022-11-16

## 2022-11-16 LAB
25(OH)D3 SERPL-MCNC: 12.5 NG/ML (ref 30–100)
CHOLEST SERPL-MCNC: 183 MG/DL
EST. AVERAGE GLUCOSE BLD GHB EST-MCNC: 91 MG/DL
FOLATE SERPL-MCNC: 16.8 NG/ML (ref 3.1–17.5)
HBA1C MFR BLD: 4.8 %
HDLC SERPL-MCNC: 45 MG/DL
LDLC SERPL CALC-MCNC: 126 MG/DL (ref 0–100)
NONHDLC SERPL-MCNC: 138 MG/DL
TRIGL SERPL-MCNC: 61 MG/DL
VIT B12 SERPL-MCNC: 523 PG/ML (ref 100–900)

## 2022-11-16 RX ORDER — TRAZODONE HYDROCHLORIDE 50 MG/1
50 TABLET ORAL
Status: DISCONTINUED | OUTPATIENT
Start: 2022-11-16 | End: 2022-11-18 | Stop reason: HOSPADM

## 2022-11-16 RX ORDER — BACITRACIN, NEOMYCIN, POLYMYXIN B 400; 3.5; 5 [USP'U]/G; MG/G; [USP'U]/G
1 OINTMENT TOPICAL 2 TIMES DAILY
Status: DISCONTINUED | OUTPATIENT
Start: 2022-11-16 | End: 2022-11-18 | Stop reason: HOSPADM

## 2022-11-16 RX ORDER — VENLAFAXINE HYDROCHLORIDE 75 MG/1
75 CAPSULE, EXTENDED RELEASE ORAL DAILY
Status: DISCONTINUED | OUTPATIENT
Start: 2022-11-17 | End: 2022-11-18 | Stop reason: HOSPADM

## 2022-11-16 RX ADMIN — ACETAMINOPHEN 650 MG: 325 TABLET, FILM COATED ORAL at 14:54

## 2022-11-16 RX ADMIN — NICOTINE POLACRILEX 2 MG: 4 GUM, CHEWING BUCCAL at 08:08

## 2022-11-16 RX ADMIN — NICOTINE POLACRILEX 2 MG: 4 GUM, CHEWING BUCCAL at 21:32

## 2022-11-16 RX ADMIN — ARIPIPRAZOLE 5 MG: 5 TABLET ORAL at 08:09

## 2022-11-16 RX ADMIN — BACITRACIN ZINC, NEOMYCIN, POLYMYXIN B 1 SMALL APPLICATION: 400; 3.5; 5 OINTMENT TOPICAL at 17:33

## 2022-11-16 RX ADMIN — BACITRACIN ZINC, NEOMYCIN, POLYMYXIN B 1 SMALL APPLICATION: 400; 3.5; 5 OINTMENT TOPICAL at 10:00

## 2022-11-16 RX ADMIN — TRAZODONE HYDROCHLORIDE 50 MG: 50 TABLET ORAL at 21:14

## 2022-11-16 RX ADMIN — Medication 3 MG: at 21:14

## 2022-11-16 RX ADMIN — NICOTINE POLACRILEX 2 MG: 4 GUM, CHEWING BUCCAL at 13:33

## 2022-11-16 RX ADMIN — VENLAFAXINE HYDROCHLORIDE 37.5 MG: 37.5 CAPSULE, EXTENDED RELEASE ORAL at 08:09
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
## 2022-11-16 NOTE — ASSESSMENT & PLAN NOTE
• Admission labs reviewed, CBC, CMP, RPR, HbA1c, lipid panel, TSH, UA acceptable  • Vitals stable   • UDS positive for benzos and THC  • COVID-19 negative  • No ECG on admission  • Medically stable for continued inpatient psychiatric treatment based on available results  • Please contact [facility] with any questions or concerns

## 2022-11-16 NOTE — PROGRESS NOTES
Progress Note - [facility] 23 y o  female MRN: [id number]   Unit/Bed#: U 254-01 Encounter: [id number]    Behavior over the last 24 hours: unchanged  Trung Partida is a 30-year-old female with history of MDD who presents for psychiatric follow-up  Staff reports no behavioral issues overnight  She is depressed appearing, tearful and despondent during today's interview  She requires frequent consoling  She states that she feels like a failure and she harbors tremendous guilt for putting her son, El Falcon, through this whole ordeal   She does not know how she can go on and states that she struggles to use any coping skills  Sleep is poor, melatonin did not help  She feels restless and anxious  She denies any active SI but admits to passive death wish  Appetite is regular  Denies any side effects from medications  No psychotic symptoms  Sleep: insomnia  Appetite: normal  Medication side effects: No   ROS: all other systems are negative    Mental Status Evaluation:    Appearance: casually dressed, consistent with stated age  Motor: +psychomotor retardation, no gait abnormalities  Behavior: cooperative, answers questions appropriately  Speech: soft, normal rhythm  Mood:  Sad, depressed  Affect:  Tearful, constricted, depressed-appearing  Thought Process: linear and goal-oriented; negative thought process  Thought Content: denies auditory or visual hallucinations  Perception: denies delusions or other perceptual disturbances  Risk Potential:  Admits passive death wish, denies suicidal ideation, plan, or intent   Denies homicidal ideation  Sensorium: Oriented to person, place, time, and situation  Cognition: cognitive ability appears intact but was not quantitatively tested  Consciousness: alert and awake  Attention: intact, able to focus without difficulty  Insight: fair  Judgement: limited    Vital signs in last 24 hours:    Temp:  [97 9 °F (36 6 °C)-98 6 °F (37 °C)] 98 °F (36 7 °C)  HR: [71-90] 71  Resp:  [18] 18  BP: (115-138)/(73-93) 137/79    Laboratory results: I have personally reviewed all pertinent laboratory/tests results    Results from the past 24 hours:   Recent Results (from the past 24 hour(s))   Lipid panel    Collection Time: 11/16/22  6:15 AM   Result Value Ref Range    Cholesterol 183 See Comment mg/dL    Triglycerides 61 See Comment mg/dL    HDL, Direct 45 (L) >=50 mg/dL    LDL Calculated 126 (H) 0 - 100 mg/dL    Non-HDL-Chol (CHOL-HDL) 138 mg/dl     Most Recent Labs:   Lab Results   Component Value Date    WBC 11 88 (H) 11/14/2022    RBC 4 44 11/14/2022    HGB 12 6 11/14/2022    HCT 38 7 11/14/2022     11/14/2022    RDW 14 3 11/14/2022    NEUTROABS 7 54 11/14/2022    SODIUM 140 11/14/2022    K 4 3 11/14/2022     11/14/2022    CO2 30 11/14/2022    BUN 15 11/14/2022    CREATININE 0 82 11/14/2022    GLUC 91 11/14/2022    CALCIUM 9 4 11/14/2022    AST 14 11/14/2022    ALT 21 11/14/2022    ALKPHOS 91 11/14/2022    TP 7 5 11/14/2022    ALB 3 8 11/14/2022    TBILI 0 23 11/14/2022    CHOLESTEROL 183 11/16/2022    HDL 45 (L) 11/16/2022    TRIG 61 11/16/2022    LDLCALC 126 (H) 11/16/2022    [place] 138 11/16/2022       Progress Toward Goals: progressing    Assessment/Plan   Principal Problem:    Recurrent major depressive disorder ([address] )  Active Problems:    Bipolar disorder (HCC)    PMDD (premenstrual dysphoric disorder)    Anxiety    PTSD (post-traumatic stress disorder)      Recommended Treatment:     Planned medication and treatment changes: All current active medications have been reviewed  Encourage group therapy, milieu therapy and occupational therapy  Behavioral Health checks every 7 minutes    Add trazodone 50 mg at bedtime tonight for sleep  Increase Effexor to 75 mg daily beginning tomorrow for depression      Current Facility-Administered Medications   Medication Dose Route Frequency Provider Last Rate   • acetaminophen  650 mg Oral Q6H PRN Garry Nielsen, MD     • acetaminophen  650 mg Oral Q4H PRN Rain Lundberg MD     • acetaminophen  975 mg Oral Q6H PRN aRin Lundberg MD     • aluminum-magnesium hydroxide-simethicone  30 mL Oral Q4H PRN Rain Lundberg MD     • ARIPiprazole  5 mg Oral Daily Marisa Ching MD     • haloperidol lactate  2 5 mg Intramuscular Q4H PRN Max 4/day Rain Lundberg MD      And   • LORazepam  1 mg Intramuscular Q4H PRN Max 4/day Rain Lundberg MD      And   • benztropine  0 5 mg Intramuscular Q4H PRN Max 4/day Rain Lundberg MD     • haloperidol lactate  5 mg Intramuscular Q4H PRN Max 4/day Rain Lundberg MD      And   • LORazepam  2 mg Intramuscular Q4H PRN Max 4/day Rain Lundberg MD      And   • benztropine  1 mg Intramuscular Q4H PRN Max 4/day Rain Lundberg MD     • benztropine  1 mg Oral Q4H PRN Max 6/day Rain Lundberg MD     • bisacodyl  10 mg Rectal Daily PRN Marisa Ching MD     • hydrOXYzine HCL  50 mg Oral Q6H PRN Max 4/day Rain Lundberg MD      Or   • diphenhydrAMINE  50 mg Intramuscular Q6H PRN Rain Lundberg MD     • haloperidol  1 mg Oral Q6H PRN Rain Lundberg MD     • haloperidol  2 5 mg Oral Q4H PRN Max 4/day Rain Lundberg MD     • haloperidol  5 mg Oral Q4H PRN Max 4/day Rain Lundberg MD     • hydrOXYzine HCL  100 mg Oral Q6H PRN Max 4/day Rain Lundberg MD      Or   • LORazepam  2 mg Intramuscular Q6H PRN Rain Lundberg MD     • hydrOXYzine HCL  25 mg Oral Q6H PRN Max 4/day Rain Lundberg MD     • melatonin  3 mg Oral HS Rain Lundberg MD     • neomycin-bacitracin-polymyxin b  1 small application Topical BID Torsten Cutler PA-C     • nicotine polacrilex  2 mg Oral Q2H PRN Rain Lundberg MD     • polyethylene glycol  17 g Oral Daily PRN Marisa Ching MD     • senna-docusate sodium  1 tablet Oral Daily PRN Rain Lundberg MD     • traZODone  50 mg Oral HS PRN Rain Lundberg MD     • venlafaxine  37 5 mg Oral Daily Roshan Cadet MD       Risks / Benefits of Treatment:    Risks, benefits, and possible side effects of medications explained to patient and patient verbalizes understanding and agreement for treatment  Counseling / Coordination of Care:    Patient's progress discussed with staff in treatment team meeting  Medications, treatment progress and treatment plan reviewed with patient      Shayy Blanco PA-C 11/16/22

## 2022-11-16 NOTE — TREATMENT TEAM
11/16/22 1330   Activity/Group Checklist   Group Other (Comment)  (art therapy- blindly pick 5 drawing materials and only use what is at your table )   Attendance Attended   Attendance Duration (min) 46-60   Interactions Interacted appropriately   Affect/Mood Appropriate   Goals Achieved Identified feelings; Able to engage in interactions; Able to listen to others; Other (Comment)  (authentic, spontaneous self expression, connection, and insight  -working with what you have to create something )

## 2022-11-16 NOTE — CONSULTS
[address] [facility] 2000, 23 y o  female MRN: [id number]  Unit/Bed#: [facility] 254-01 Encounter: [id number]  Primary Care Provider: Yuval Russell MD   Date and time admitted to hospital: 11/15/2022  1:19 PM    Inpatient consult for Medical Clearance for [address] patient  Consult performed by: Kiran Bedolla PA-C  Consult ordered by: Carol Reeder MD          * Medical clearance for psychiatric admission  Assessment & Plan  • Admission labs reviewed, CBC, CMP, RPR, HbA1c, lipid panel, TSH, UA acceptable  • Vitals stable   • UDS positive for benzos and THC  • COVID-19 negative  • No ECG on admission  • Medically stable for continued inpatient psychiatric treatment based on available results  • Please contact [facility] with any questions or concerns      Anxiety  Assessment & Plan  Management by psychiatry services    Bipolar disorder [facility])  Assessment & Plan  Management by psychiatry services      Recommendations for Discharge:  · Lou Pines will continue to be available for any questions or concerns  · Follow up with PCP upon discharge  Counseling / Coordination of Care Time: 30 minutes  Greater than 50% of total time spent on patient counseling and coordination of care  Collaboration of Care: Were Recommendations Directly Discussed with Primary Treatment Team? - Yes     History of Present Illness:    Guillermo Lema is a 23 y o  female who is originally admitted to the psychiatric service due to worsening anxiety and depression, intermittent suicidal ideation without plan  Reported previous attempt on last 3 months with cutting herself  We are consulted for medical clearance for psychiatric hospitalization and medical management  Patient does not have any medical conditions per chart or per patient  Her vital signs are stable  She denies any current symptoms including chest pain, shortness a breath, nausea, vomiting, lightheadedness, syncope    She is medically stable for psychiatric admission  Review of Systems:    Review of Systems   Constitutional: Negative for appetite change, chills, fatigue and fever  HENT: Negative for ear pain, sore throat and trouble swallowing  Eyes: Negative for visual disturbance  Respiratory: Negative for cough, chest tightness, shortness of breath and wheezing  Cardiovascular: Negative for chest pain, palpitations and leg swelling  Gastrointestinal: Negative for abdominal distention, abdominal pain, diarrhea, nausea and vomiting  Endocrine: Negative  Genitourinary: Negative for dysuria, flank pain and hematuria  Musculoskeletal: Negative for arthralgias, gait problem and myalgias  Skin: Negative for pallor  Allergic/Immunologic: Negative for immunocompromised state  Neurological: Negative for dizziness, syncope, light-headedness, numbness and headaches  Past Medical and Surgical History:     Past Medical History:   Diagnosis Date   • [facility]        No past surgical history on file  Meds/Allergies:    all medications and allergies reviewed    Allergies: No Known Allergies    Social History:     Marital Status: Single    Substance Use History:   Social History     Substance and Sexual Activity   Alcohol Use Yes    Comment: social     Social History     Tobacco Use   Smoking Status Never   Smokeless Tobacco Never     Social History     Substance and Sexual Activity   Drug Use Yes   • Types: Marijuana       Family History:    non-contributory    Physical Exam:     Vitals:   Blood Pressure: 135/83 (11/16/22 1438)  Pulse: 97 (11/16/22 1438)  Temperature: 98 1 °F (36 7 °C) (11/16/22 1438)  Temp Source: Tympanic (11/16/22 1438)  Respirations: 18 (11/16/22 1438)  Height: 4' 10" (147 3 cm) (11/15/22 1326)  Weight - Scale: 55 4 kg (122 lb 3 2 oz) (Waist: 28") (11/15/22 1326)  SpO2: 100 % (11/16/22 1137)    Physical Exam  Vitals and nursing note reviewed  Constitutional:       Appearance: Normal appearance  HENT:      Head: Normocephalic and atraumatic  Mouth/Throat:      Mouth: Mucous membranes are moist       Pharynx: Oropharynx is clear  No oropharyngeal exudate  Eyes:      Extraocular Movements: Extraocular movements intact  Cardiovascular:      Rate and Rhythm: Normal rate and regular rhythm  Pulses: Normal pulses  Heart sounds: Normal heart sounds  No murmur heard  No friction rub  No gallop  Pulmonary:      Effort: Pulmonary effort is normal  No respiratory distress  Breath sounds: Normal breath sounds  No stridor  No wheezing or rales  Abdominal:      General: Abdomen is flat  Bowel sounds are normal  There is no distension  Palpations: Abdomen is soft  Tenderness: There is no abdominal tenderness  Musculoskeletal:      Right lower leg: No edema  Left lower leg: No edema  Skin:     General: Skin is warm and dry  Neurological:      General: No focal deficit present  Mental Status: She is alert and oriented to person, place, and time  Additional Data:     Lab Results:     Results from last 7 days   Lab Units 11/14/22  1416   WBC Thousand/uL 11 88*   HEMOGLOBIN g/dL 12 6   HEMATOCRIT % 38 7   PLATELETS Thousands/uL 362   NEUTROS PCT % 63   LYMPHS PCT % 29   MONOS PCT % 5   EOS PCT % 2     Results from last 7 days   Lab Units 11/14/22  1416   SODIUM mmol/L 140   POTASSIUM mmol/L 4 3   CHLORIDE mmol/L 104   CO2 mmol/L 30   BUN mg/dL 15   CREATININE mg/dL 0 82   ANION GAP mmol/L 6   CALCIUM mg/dL 9 4   ALBUMIN g/dL 3 8   TOTAL BILIRUBIN mg/dL 0 23   ALK PHOS U/L 91   ALT U/L 21   AST U/L 14   GLUCOSE RANDOM mg/dL 91             Lab Results   Component Value Date/Time    HGBA1C 4 8 11/16/2022 06:15 AM               Imaging: I have personally reviewed pertinent reports        No orders to display       EKG, Pathology, and Other Studies Reviewed on Admission:   · EKG: see above    ** Please Note: This note has been constructed using a voice recognition system   **

## 2022-11-16 NOTE — PLAN OF CARE
Problem: Ineffective Coping  Goal: Identifies healthy coping skills  Outcome: Progressing  Goal: Participates in unit activities  Description: Interventions:  - Provide therapeutic environment   - Provide required programming   - Redirect inappropriate behaviors   Outcome: Progressing     Problem: Risk for Self Injury/Neglect  Goal: Verbalize thoughts and feelings  Description: Interventions:  - Assess and re-assess patient's lethality and potential for self-injury  - Engage patient in 1:1 interactions, daily, for a minimum of 15 minutes  - Encourage patient to express feelings, fears, frustrations, hopes  - Establish rapport/trust with patient   Outcome: Progressing  Goal: Refrain from harming self  Description: Interventions:  - Monitor patient closely, per order  - Develop a trusting relationship  - Supervise medication ingestion, monitor effects and side effects   Outcome: Progressing     Problem: Depression  Goal: Treatment Goal: Demonstrate behavioral control of depressive symptoms, verbalize feelings of improved mood/affect, and adopt new coping skills prior to discharge  Outcome: Progressing  Goal: Refrain from isolation  Description: Interventions:  - Develop a trusting relationship   - Encourage socialization   Outcome: Progressing     Problem: Anxiety  Goal: Anxiety is at manageable level  Description: Interventions:  - Assess and monitor patient's anxiety level  - Monitor for signs and symptoms (heart palpitations, chest pain, shortness of breath, headaches, nausea, feeling jumpy, restlessness, irritable, apprehensive)  - Collaborate with interdisciplinary team and initiate plan and interventions as ordered    - [place] patient to unit/surroundings  - Explain treatment plan  - Encourage participation in care  - Encourage verbalization of concerns/fears  - Identify coping mechanisms  - Assist in developing anxiety-reducing skills  - Administer/offer alternative therapies  - Limit or eliminate stimulants  Outcome: Progressing     Problem: SLEEP DISTURBANCE  Goal: Will exhibit normal sleeping pattern  Description: Interventions:  -  Assess the patients sleep pattern, noting recent changes  - Administer medication as ordered  - Decrease environmental stimuli, including noise, as appropriate during the night  - Encourage the patient to actively participate in unit groups and or exercise during the day to enhance ability to achieve adequate sleep at night  - Assess the patient, in the morning, encouraging a description of sleep experience  Outcome: Progressing

## 2022-11-16 NOTE — PROGRESS NOTES
Status: Pt is a 89 from [facility], presenting with increased depression & anxiety & intermittent SI  Pt reports being off her medications for several months & has had increased panic attacks  Pt has an 21 month old child, who is currently with Pt's parents  Pt had superficial scratches  to her arm  Pt is pleasant & cooperative upon arrival to the unit  She appeared to have slept overnight  Medication: Abilify, Effexor, & Melatonin started / no PRN    D/C: TBD / new admission     11/16/22 0750   Team Meeting   Meeting Type Daily Rounds   Team Members Present   Team Members Present Physician;Nurse;; Other (Discipline and Name)   Physician Team Member Dr Kendrick Sparks / Heather Krishna / Nestor Beverage Team Member [facility] Management Team Member Charisse Valdez / Megan Romero   Other (Discipline and Name) Haim Betancourt (art therapy)   Patient/Family Present   Patient Present No   Patient's Family Present No

## 2022-11-16 NOTE — NURSING NOTE
Pt visible on unit and social with peers  Reports disrupted sleep however improved compared to home  Reports feeling calm and comfortable on unit and that being here is "eye opening"  Pt is forward thinking  Denies SI or any thoughts of self harm

## 2022-11-16 NOTE — PLAN OF CARE
Problem: DISCHARGE PLANNING  Goal: Discharge to home or other facility with appropriate resources  Description: INTERVENTIONS:  - Identify barriers to discharge w/patient and caregiver  - Arrange for needed discharge resources and transportation as appropriate  - Identify discharge learning needs (meds, wound care, etc )  - Arrange for interpretive services to assist at discharge as needed  - Refer to Case Management Department for coordinating discharge planning if the patient needs post-hospital services based on physician/advanced practitioner order or complex needs related to functional status, cognitive ability, or social support system  Outcome: Progressing  Note: Pt met with CM to review & sign ROIs & complete intake

## 2022-11-16 NOTE — NURSING NOTE
Pt visible and social with peers, playing games in [place]  Appears anxious, pleasant with staff and adjusting well to the unit- no prn's needed  Denies SI/HI/AVH  Reported having a belly button piercing done two weeks ago- appears red and irritated  Provided with saline flush and alcohol wipe to clean area with BHT supervision

## 2022-11-17 PROBLEM — Z00.8 MEDICAL CLEARANCE FOR PSYCHIATRIC ADMISSION: Status: RESOLVED | Noted: 2022-11-16 | Resolved: 2022-11-17

## 2022-11-17 LAB — RPR SER QL: NORMAL

## 2022-11-17 RX ADMIN — Medication 3 MG: at 21:33

## 2022-11-17 RX ADMIN — ARIPIPRAZOLE 5 MG: 5 TABLET ORAL at 09:07

## 2022-11-17 RX ADMIN — NICOTINE POLACRILEX 2 MG: 4 GUM, CHEWING BUCCAL at 17:04

## 2022-11-17 RX ADMIN — NICOTINE POLACRILEX 2 MG: 4 GUM, CHEWING BUCCAL at 06:07

## 2022-11-17 RX ADMIN — BACITRACIN ZINC, NEOMYCIN, POLYMYXIN B 1 SMALL APPLICATION: 400; 3.5; 5 OINTMENT TOPICAL at 18:44

## 2022-11-17 RX ADMIN — TRAZODONE HYDROCHLORIDE 50 MG: 50 TABLET ORAL at 21:33

## 2022-11-17 RX ADMIN — BACITRACIN ZINC, NEOMYCIN, POLYMYXIN B 1 SMALL APPLICATION: 400; 3.5; 5 OINTMENT TOPICAL at 09:08

## 2022-11-17 RX ADMIN — VENLAFAXINE HYDROCHLORIDE 75 MG: 75 CAPSULE, EXTENDED RELEASE ORAL at 09:07

## 2022-11-17 NOTE — DISCHARGE SUMMARY
Discharge Summary - Jovanny Martinez 23 y o  female MRN: [id number]  Unit/Bed#: -01 Encounter: [id number]     Admission Date: 11/15/2022         Discharge Date: 11/18/22    Attending Psychiatrist: Liliana Ma*    Reason for Admission/HPI:     Per initial H&P from Dr Loan Tabor on 11/15/22:    "Per ED provider on 614: "29-year-old female without pertinent history who presents to the emergency department for anxiety and depression recently and intermittent SI with no plan   Reports previous attempts in the last 3 months with cutting herself and punching herself  Diamond Cuenca reports she has not had any SI today but did have SI recently day before yesterday   States that when she does have these thoughts she tries to harm herself   States that she has jumped out of a car in the past and has also banged her head against the wall   Reports a mild headache at this time from hitting herself yesterday   Reports that she has also cut herself in the past but nothing recently  Diamond Cuenca states that she is interested in inpatient psychiatry evaluation and management as she stop using her previous medications   Reports no HI or hallucinations   Denies any physical complaints otherwise "        This is 22 yo female with hx of PTSD/Bipolar/anxiuety/depression/ADHD admitted to inpatient unit on voluntary status for worsening of mood, suicidal ideations and self harming behaviors  Patient endorses depressed mood, guilt, mood swings, irritability, lashing out easily, poor concentration and frequent arguments with boy friend  She also having thoughts to hurt self and cut her arm superficially a week ago  The mood gets more worse prior to menses  "Billy sensitive, emotional  I get angry and take on myself" She also over dosed on pills a month ago but didn't get any help   She also reports poor sleep due to nightmares, racing thoughts and flashbacks "      Social History     Tobacco History     Smoking Status  Never    Smokeless Tobacco Use  Never          Alcohol History     Alcohol Use Status  Yes Comment  social          Drug Use     Drug Use Status  Yes Types  Marijuana          Sexual Activity     Sexually Active  Not Asked          Activities of Daily Living    Not Asked               Additional Substance Use Detail     Questions Responses    Problems Due to Past Use of Alcohol? No    Problems Due to Past Use of Substances? Yes    Substance Use Assessment Substance use greater than 12 months ago    Alcohol Use Frequency Past abuse    Cannabis frequency Daily    Comment:  Daily on 11/15/2022     Heroin Frequency Denies use in past 12 months    Cannabis method Other    Comment: vapes     Cocaine frequency Never used    Comment:  Never used on 11/15/2022     Crack Cocaine Frequency Denies use in past 12 months    Methamphetamine Frequency Denies use in past 12 months    Narcotic Frequency Denies use in past 12 months    Benzodiazepine Frequency Past abuse    Amphetamine frequency Denies use in past 12 months    Barbituate Frequency Denies use use in past 12 months    Inhalant frequency Never used    Comment:  Past regular use on 11/15/2022 Never used on 11/15/2022     Hallucinogen frequency Never used    Comment:  Never used on 11/15/2022     Ecstasy frequency Never used    Comment:  Never used on 11/15/2022     Other drug frequency Never used    Comment:  Never used on 11/15/2022     Opiate frequency Denies use in past 12 months    Last reviewed by Miguel Juan RN on 11/15/2022          Past Medical History:   Diagnosis Date   • Bipolar disorder ([facility] )      No past surgical history on file  Medications: All current active medications have been reviewed    Medications prior to admission:    None       Allergies:     No Known Allergies    Objective     Vital signs in last 24 hours:    Temp:  [97 °F (36 1 °C)-98 °F (36 7 °C)] 97 °F (36 1 °C)  HR:  [77-90] 77  Resp:  [18] 18  BP: (133-141)/(83-89) 133/83    No intake or output data in the 24 hours ending 11/18/22 0925 [place] was admitted to the inpatient psychiatric unit and started on Behavioral Health checks every 7 minutes  During the hospitalization she was encouraged to attend individual therapy, group therapy, milieu therapy and occupational therapy  Psychiatric medications were adjusted over the hospital stay  To address depressive symptoms, mood swings, irritability, self-abusive behavior, anxiety symptoms and insomnia, Krista was treated with antidepressant Venlafaxine ER and Trazodone and antipsychotic medication Abilify  Medication doses were restarted during the hospital course  Venlafaxine ER was restarted and titrated to 75mg daily  Trazodone was added at the dose of 50mg qhs for sleep  Abilify was restarted at the dose of 5mg daily  Prior to beginning of treatment medications risks and benefits and possible side effects including risk of parkinsonian symptoms, Tardive Dyskinesia and metabolic syndrome related to treatment with antipsychotic medications and risk of suicidality and serotonin syndrome related to treatment with antidepressants were reviewed with Krista  She verbalized understanding and agreement for treatment  Upon admission Krista was seen by medical service for medical clearance for inpatient treatment and medical follow up  Krista's symptoms slowly improved over the hospital course  Initially after admission she was still feeling depressed, anxious and overwhelmed  With adjustment of medications and therapeutic milieu her symptoms gradually improved  At the end of treatment Krista was doing much better  Her mood was significantly improved at the time of discharge  Krista denied suicidal ideation, intent or plan at the time of discharge and denied homicidal ideation, intent or plan at the time of discharge  Krista was participating appropriately in milieu at the time of discharge   Behavior was appropriate on the unit at the time of discharge  Sleep and appetite were improved  Krista was tolerating medications and was not reporting any significant side effects at the time of discharge  Since Sebastián Hernandez was doing well at the end of the hospitalization, treatment team felt that she could be safely discharged to outpatient care  Krista also felt stable and ready for discharge at the end of the hospital stay  Mental Status at Time of Discharge:     Appearance: casually dressed, appears consistent with stated age, normal grooming  Motor: no psychomotor disturbances, no gait abnormalities  Behavior: calm, cooperative, interacts with this writer appropriately  Speech: normal rate, rhythm, and volume  Mood: "I feel better now" less depressed, less anxious  Affect: more appropriate, no longer tearful, slightly brighter, mood-congruent  Thought Process: organized, linear, and goal-oriented; intact associations  Thought Content: denies any delusional material, no preoccupation  Perception: denies any auditory or visual hallucinations, denies other perceptual disturbances  Risk Potential: denies suicidal ideation, plan, or intent   Denies homicidal ideation  Sensorium: Oriented to person, place, time, and situation  Cognition: cognitive ability appears intact but was not quantitatively tested  Consciousness: alert and awake  Attention/Concentration: able to focus without difficulty, attention and concentration are age appropriate  Insight: improved  Judgement: improved    Admission Diagnosis:    Principal Problem:    Recurrent major depressive disorder ([facility] )  Active Problems:    Bipolar disorder ([facility] )    PMDD (premenstrual dysphoric disorder)    Anxiety    PTSD (post-traumatic stress disorder)      Discharge Diagnosis:     Principal Problem:    Recurrent major depressive disorder ([facility] )  Active Problems:    Bipolar disorder ([facility] )    PMDD (premenstrual dysphoric disorder)    Anxiety    PTSD (post-traumatic stress disorder)  Resolved Problems:    Medical clearance for psychiatric admission      Lab Results:   I have personally reviewed all pertinent laboratory/tests results  Most Recent Labs:   Lab Results   Component Value Date    WBC 11 88 (H) 11/14/2022    RBC 4 44 11/14/2022    HGB 12 6 11/14/2022    HCT 38 7 11/14/2022     11/14/2022    RDW 14 3 11/14/2022    NEUTROABS 7 54 11/14/2022    SODIUM 140 11/14/2022    K 4 3 11/14/2022     11/14/2022    CO2 30 11/14/2022    BUN 15 11/14/2022    CREATININE 0 82 11/14/2022    GLUC 91 11/14/2022    CALCIUM 9 4 11/14/2022    AST 14 11/14/2022    ALT 21 11/14/2022    ALKPHOS 91 11/14/2022    TP 7 5 11/14/2022    ALB 3 8 11/14/2022    TBILI 0 23 11/14/2022    CHOLESTEROL 183 11/16/2022    HDL 45 (L) 11/16/2022    TRIG 61 11/16/2022    LDLCALC 126 (H) 11/16/2022    [place] 138 11/16/2022    RPR Non-Reactive 11/16/2022    HGBA1C 4 8 11/16/2022    EAG 91 11/16/2022       Discharge Medications:    See after visit summary for all reconciled discharge medications provided to patient and family  Discharge instructions/Information to patient and family:     See after visit summary for information provided to patient and family  Provisions for Follow-Up Care:    See after visit summary for information related to follow-up care and any pertinent home health orders  Discharge Statement:    I spent 33 minutes discharging the patient  This time was spent on the day of discharge  I had direct contact with the patient on the day of discharge  Additional documentation is required if more than 30 minutes were spent on discharge:    I reviewed with Krista importance of compliance with medications and outpatient treatment after discharge  I discussed the medication regimen and possible side effects of the medications with Krista prior to discharge  At the time of discharge she was tolerating psychiatric medications  I discussed outpatient follow up with Braden Kaufman I reviewed with Krista crisis plan and safety plan upon discharge  Kedar Filiberto was competent to understand risks and benefits of withholding information and risks and benefits of her actions      Discharge on Two Antipsychotic Medications: La Anderson PA-C 11/18/22

## 2022-11-17 NOTE — PROGRESS NOTES
Treatment Plan Meeting:  Diagnosis of Recurrent major depressive disorder, Bipolar disorder, PMDD, Anxiety, & PTSD reviewed  Discussed short term goals for decrease in depressive symptoms, decrease in anxiety symptoms, decrease in paranoid thoughts, decrease in suicidal thoughts, decrease in self abusive behaviors, improvement in insight, sleep improvement, improvement in appetite, & mood stabilization  Pt stated that she did not sleep well overnight, however, felt this was due to her roommate being loud & restless  Pt said that she may request ear plugs tonight to see if that helps  Pt expressed she is hopeful for d/c tomorrow, as she misses her 21 month old son  The treatment team in agreement with tentative d/c for tomorrow  Pt just started outpatient services with [facility] & agreed with an [facility] referral     All parties in agreement & treatment plan was signed        11/17/22 0910   Team Meeting   Meeting Type Tx Team Meeting   Initial Conference Date 11/17/22   Next Conference Date 12/13/22   Team Members Present   Team Members Present Physician;Nurse;   Physician Team Member Dr Esther Bray / Zarina Tinsley Team Member KADIE [facility] Management Team Member Mary Ellen   Patient/Family Present   Patient Present Yes   Patient's Family Present No

## 2022-11-17 NOTE — TREATMENT TEAM
11/17/22 1000   Activity/Group Checklist   Group Community meeting   Attendance Attended   Attendance Duration (min) 16-30   Interactions Interacted appropriately   Affect/Mood Appropriate   Goals Achieved Able to listen to others; Able to engage in interactions; Other (Comment)  (identified goals for the day)

## 2022-11-17 NOTE — DISCHARGE INSTR - OTHER ORDERS
Crisis/Emergency Services  Emergency crisis services are available 24 hours a day, 7 days a week, and 365 days a year  For any crisis call [phone] or [phone]  [facility] Office: Crisis/Emergency Services  Phone: [phone]  Address: [address] [place] [address] [facility]  Provides emergency crisis services 24 hours a day, 7 days a week, and 365 days a year  Walk-ins go to the rear of [place] [id number] (behind [address] [place] [address] on [employer] )    [facility]: Crisis/Emergency Services  Phone: [phone]  Address: [address] [place] [address] [facility]  Provides crisis services that are available 24 hours a day, 7 days a week, and 365 days a year    The [employer] on Mental Illness [employer] [facility]) offers various education & support groups for you & your family  For more information visit their website at   MyAcademicProgram  Text CONNECT to 449772 from anywhere in the [place], anytime, about any type of crisis  A live, trained Crisis Counselor receives the text and lets you know that they are here to listen  The volunteer Crisis Counselor will help you move from a hot moment to a cool moment  The [address] [place] [address] [place] (formerly known as the [facility]) provides free and confidential emotional support to people in suicidal crisis or emotional distress 24 hours a day, 7 days a week, across the [place]  The [facility] is comprised of a national network of over 200 local crisis centers, combining custom local care and resources with national standards and best practices

## 2022-11-17 NOTE — TREATMENT TEAM
11/17/22 1420   Activity/Group Checklist   Group Admission/Discharge   Attendance Attended   Attendance Duration (min) 0-15   Interactions Interacted appropriately  (pt independently filledout the admission self assessment and relapse prevention plan forms   there were no questions once completed )   Affect/Mood Appropriate   Goals Achieved Able to engage in interactions

## 2022-11-17 NOTE — PROGRESS NOTES
Progress Note - Behavioral Health     Saira Arreaga 23 y o  female MRN: [id number]  Unit/Bed#: -01 Encounter: [id number]      Saira Arreaga was seen for continuing care and reviewed with treatment team   Pt was in milieu, pleasant on approach  She reported "It's going really good" and she is "In a good mood "  Pt is a little bit anxious mainly to go home and was able to work things out with her family so she may return to parents  She is trying to keep calmer with positive self talk and learned a new coping mechanism on unit--drawing  She presently denies depression, SI, HI, or psychotic Sxs  She intends to get a job at a SNF and continue therapy with our prior outside therapist after point of future discharge  Per EMR and floor team, Pt has been calm, cooperative and mediation compliant  She has been attending therapeutic groups with appropriate behavior among peers  Trazodone appeared to help her sleep per nursing observation        Sleep: Fair to Good--has gotten better per Pt  Appetite: Good   Medication side effects: None per Pt    ROS: No complaints per Pt    Labs/Tests: Reviewed    Mental Status Evaluation:  Appearance:  Dressed, clean, good eye contact   Behavior:  Calm, cooperative, pleasant mildly superficial   Speech:  Clear, normal rate and volume   Mood:  Anxious--but overall less so   Affect:  Appropriately broad   Thought Process:  Organized, Goal directed, more positive, appears to be forward thinking   Associations: Intact associations   Thought Content:  No verbalized delusions   Perceptual Disturbances: Pt denies any hallucinations or paranoia and does not appear to be responding to internal stimuli   Risk Potential: Pt presently denies SI or HI    Sensorium:  Self, birthday, Place, Day of the week, Month, Year   Memory:  short term memory grossly intact   Consciousness:  alert, awake   Attention: Good   Insight:  Fair   Judgment: Fair   Gait/Station: Normal gait/station   Motor Activity: No abnormal movements     Vitals:    11/16/22 0757 11/16/22 1137 11/16/22 1438 11/17/22 0809   BP: 115/73 137/79 135/83 119/76   BP Location: Right arm Right arm Right arm Right arm   Pulse: 75 71 97 77   Resp: 18 18 18 18   Temp: 98 6 °F (37 °C) 98 °F (36 7 °C) 98 1 °F (36 7 °C) 98 9 °F (37 2 °C)   TempSrc: Tympanic Tympanic Tympanic Tympanic   SpO2:  100%  99%   Weight:       Height:           Admission on 11/15/2022   Component Date Value   • Folate 11/16/2022 16 8    • Cholesterol 11/16/2022 183    • Triglycerides 11/16/2022 61    • HDL, Direct 11/16/2022 45 (L)    • LDL Calculated 11/16/2022 126 (H)    • Non-HDL-Chol (CHOL-HDL) 11/16/2022 138    • RPR 11/16/2022 Non-Reactive    • Vitamin B-12 11/16/2022 523    • Vit D, 25-Hydroxy 11/16/2022 12 5 (L)    • Hemoglobin A1C 11/16/2022 4 8    • EAG 11/16/2022 91      Labwork:   Recent Results (from the past 120 hour(s))   FLU/RSV/COVID - if FLU/RSV clinically relevant    Collection Time: 11/14/22  2:16 PM    Specimen: Nose; Nares   Result Value Ref Range    SARS-CoV-2 Negative Negative    INFLUENZA A PCR Negative Negative    INFLUENZA B PCR Negative Negative    RSV PCR Negative Negative   CBC and differential    Collection Time: 11/14/22  2:16 PM   Result Value Ref Range    WBC 11 88 (H) 4 31 - 10 16 Thousand/uL    RBC 4 44 3 81 - 5 12 Million/uL    Hemoglobin 12 6 11 5 - 15 4 g/dL    Hematocrit 38 7 34 8 - 46 1 %    MCV 87 82 - 98 fL    MCH 28 4 26 8 - 34 3 pg    MCHC 32 6 31 4 - 37 4 g/dL    RDW 14 3 11 6 - 15 1 %    MPV 8 6 (L) 8 9 - 12 7 fL    Platelets 090 472 - 455 Thousands/uL    nRBC 0 /100 WBCs    Neutrophils Relative 63 43 - 75 %    Immat GRANS % 1 0 - 2 %    Lymphocytes Relative 29 14 - 44 %    Monocytes Relative 5 4 - 12 %    Eosinophils Relative 2 0 - 6 %    Basophils Relative 0 0 - 1 %    Neutrophils Absolute 7 54 1 85 - 7 62 Thousands/µL    Immature Grans Absolute 0 07 0 00 - 0 20 Thousand/uL    Lymphocytes Absolute 3 45 0 60 - 4 47 Thousands/µL    Monocytes Absolute 0 59 0 17 - 1 22 Thousand/µL    Eosinophils Absolute 0 18 0 00 - 0 61 Thousand/µL    Basophils Absolute 0 05 0 00 - 0 10 Thousands/µL   Comprehensive metabolic panel    Collection Time: 11/14/22  2:16 PM   Result Value Ref Range    Sodium 140 135 - 147 mmol/L    Potassium 4 3 3 5 - 5 3 mmol/L    Chloride 104 96 - 108 mmol/L    CO2 30 21 - 32 mmol/L    ANION GAP 6 4 - 13 mmol/L    BUN 15 5 - 25 mg/dL    Creatinine 0 82 0 60 - 1 30 mg/dL    Glucose 91 65 - 140 mg/dL    Calcium 9 4 8 3 - 10 1 mg/dL    AST 14 5 - 45 U/L    ALT 21 12 - 78 U/L    Alkaline Phosphatase 91 46 - 116 U/L    Total Protein 7 5 6 4 - 8 4 g/dL    Albumin 3 8 3 5 - 5 0 g/dL    Total Bilirubin 0 23 0 20 - 1 00 mg/dL    eGFR 103 ml/min/1 73sq m   Ethanol    Collection Time: 11/14/22  2:16 PM   Result Value Ref Range    Ethanol Lvl <3 0 - 3 mg/dL   Rapid drug screen, urine    Collection Time: 11/14/22  2:21 PM   Result Value Ref Range    Amph/Meth UR Negative Negative    Barbiturate Ur Negative Negative    Benzodiazepine Urine Positive (A) Negative    Cocaine Urine Negative Negative    Methadone Urine Negative Negative    Opiate Urine Negative Negative    PCP Ur Negative Negative    THC Urine Positive (A) Negative    Oxycodone Urine Negative Negative   FLU/RSV/COVID - if FLU/RSV clinically relevant    Collection Time: 11/15/22  9:05 AM    Specimen: Nasopharyngeal Swab; Nares   Result Value Ref Range    SARS-CoV-2 Negative Negative    INFLUENZA A PCR Negative Negative    INFLUENZA B PCR Negative Negative    RSV PCR Negative Negative   Lipid panel    Collection Time: 11/16/22  6:15 AM   Result Value Ref Range    Cholesterol 183 See Comment mg/dL    Triglycerides 61 See Comment mg/dL    HDL, Direct 45 (L) >=50 mg/dL    LDL Calculated 126 (H) 0 - 100 mg/dL    Non-HDL-Chol (CHOL-HDL) 138 mg/dl   RPR    Collection Time: 11/16/22  6:15 AM   Result Value Ref Range    RPR Non-Reactive Non-Reactive   Vitamin D 25 hydroxy Collection Time: 11/16/22  6:15 AM   Result Value Ref Range    Vit D, 25-Hydroxy 12 5 (L) 30 0 - 100 0 ng/mL   Hemoglobin A1C    Collection Time: 11/16/22  6:15 AM   Result Value Ref Range    Hemoglobin A1C 4 8 Normal 3 8-5 6%; PreDiabetic 5 7-6 4%; Diabetic >=6 5%; Glycemic control for adults with diabetes <7 0% %    EAG 91 mg/dl   Folate    Collection Time: 11/16/22  6:16 AM   Result Value Ref Range    Folate 16 8 3 1 - 17 5 ng/mL   Vitamin B12    Collection Time: 11/16/22  6:16 AM   Result Value Ref Range    Vitamin B-12 523 100 - 900 pg/mL          Progress Toward Goals:  Based on today's interview and review of prior notes, Pt has made improvement  Continue the present medication regimen unchanged  Discharge planning--for tomorrow if Pt remains stable    Assessment/Plan   Principal Problem:    Medical clearance for psychiatric admission  Active Problems:    Bipolar disorder (HCC)    Recurrent major depressive disorder (HCC)    PMDD (premenstrual dysphoric disorder)    Anxiety    PTSD (post-traumatic stress disorder)      Recommended Treatment: Continue with pharmacotherapy, group therapy, milieu therapy and occupational therapy    The patient will be maintained on the following medications:  Current Facility-Administered Medications   Medication Dose Route Frequency Provider Last Rate   • acetaminophen  650 mg Oral Q6H PRN Berlin Bey MD     • acetaminophen  650 mg Oral Q4H PRN Berlin Bey MD     • acetaminophen  975 mg Oral Q6H PRN Berlin Bey MD     • aluminum-magnesium hydroxide-simethicone  30 mL Oral Q4H PRN Berlin Bey MD     • ARIPiprazole  5 mg Oral Daily David Hernandez MD     • haloperidol lactate  2 5 mg Intramuscular Q4H PRN Max 4/day Berlin Bey MD      And   • LORazepam  1 mg Intramuscular Q4H PRN Max 4/day Berlin Bey MD      And   • benztropine  0 5 mg Intramuscular Q4H PRN Max 4/day Berlin Bey MD     • haloperidol lactate  5 mg Intramuscular Q4H PRN Max 4/day Garry Nielsen MD      And   • LORazepam  2 mg Intramuscular Q4H PRN Max 4/day Garry Nielsen MD      And   • benztropine  1 mg Intramuscular Q4H PRN Max 4/day Garry Nielsen MD     • benztropine  1 mg Oral Q4H PRN Max 6/day Garry Nielsen MD     • bisacodyl  10 mg Rectal Daily PRN Rj Pedro MD     • hydrOXYzine HCL  50 mg Oral Q6H PRN Max 4/day Garry Nielsen MD      Or   • diphenhydrAMINE  50 mg Intramuscular Q6H PRN Garry Nielsen MD     • haloperidol  1 mg Oral Q6H PRN Garry Nielsen MD     • haloperidol  2 5 mg Oral Q4H PRN Max 4/day Garry Nielsen MD     • haloperidol  5 mg Oral Q4H PRN Max 4/day Garry Nielsen MD     • hydrOXYzine HCL  100 mg Oral Q6H PRN Max 4/day Garry Nielsen MD      Or   • LORazepam  2 mg Intramuscular Q6H PRN Garry Nielsen MD     • hydrOXYzine HCL  25 mg Oral Q6H PRN Max 4/day Garry Nielsen MD     • melatonin  3 mg Oral HS Garry Nielsen MD     • neomycin-bacitracin-polymyxin b  1 small application Topical BID Mervat Levin PA-C     • nicotine polacrilex  2 mg Oral Q2H PRN Garry Nielsen MD     • polyethylene glycol  17 g Oral Daily PRN Rj Pedro MD     • senna-docusate sodium  1 tablet Oral Daily PRN Garry Nielsen MD     • traZODone  50 mg Oral HS PRN Garry Nielsen MD     • traZODone  50 mg Oral HS Tal Lloyd PA-C     • venlafaxine  75 mg Oral Daily Tal Lloyd PA-C         Risks, benefits and possible side effects of Medications:   Risks, benefits, and possible side effects of medications explained to patient and patient verbalizes understanding and Risks of medications explained if female patient   Patient verbalizes understanding and agrees to notify her doctor if she becomes pregnant

## 2022-11-17 NOTE — DISCHARGE INSTR - APPOINTMENTS
Yakelin Poewll or Cait, our Eric and Sarah, will be calling you after your discharge, on the phone number that you provided  They will be available as an additional support, if needed  If you wish to speak with one of them, you may contact Floyd Byrd at [phone] or Africa Torres at [phone]  You are being discharged home to [address], [address] [place] [address]  You provided cell phone number [phone] as the best way to contact you

## 2022-11-17 NOTE — TREATMENT TEAM
11/17/22 1330   Activity/Group Checklist   Group Other (Comment)  (art therapy)   Attendance Attended   Attendance Duration (min) 46-60   Interactions Interacted appropriately   Affect/Mood Appropriate   Goals Achieved Able to engage in interactions; Able to listen to others; Other (Comment)  (authentic, spontaneous self expression, connection, and insight)

## 2022-11-17 NOTE — BH TRANSITION RECORD
Contact Information: If you have any questions, concerns, pended studies, tests and/or procedures, or emergencies regarding your inpatient behavioral health visit  Please contact [facility] behavioral health unit [phone] and ask to speak to a , nurse or physician  A contact is available 24 hours/ 7 days a week at this number  Summary of Procedures Performed During your Stay:  Below is a list of major procedures performed during your hospital stay and a summary of results:  - No major procedures performed  Pending Studies (From admission, onward)    None        Please follow up on the above pending studies with your PCP and/or referring provider

## 2022-11-17 NOTE — PLAN OF CARE
Problem: Ineffective Coping  Goal: Identifies healthy coping skills  Outcome: Progressing  Goal: Participates in unit activities  Description: Interventions:  - Provide therapeutic environment   - Provide required programming   - Redirect inappropriate behaviors   Outcome: Progressing     Problem: Risk for Self Injury/Neglect  Goal: Verbalize thoughts and feelings  Description: Interventions:  - Assess and re-assess patient's lethality and potential for self-injury  - Engage patient in 1:1 interactions, daily, for a minimum of 15 minutes  - Encourage patient to express feelings, fears, frustrations, hopes  - Establish rapport/trust with patient   Outcome: Progressing  Goal: Refrain from harming self  Description: Interventions:  - Monitor patient closely, per order  - Develop a trusting relationship  - Supervise medication ingestion, monitor effects and side effects   Outcome: Progressing     Problem: Depression  Goal: Treatment Goal: Demonstrate behavioral control of depressive symptoms, verbalize feelings of improved mood/affect, and adopt new coping skills prior to discharge  Outcome: Progressing  Goal: Refrain from isolation  Description: Interventions:  - Develop a trusting relationship   - Encourage socialization   Outcome: Progressing     Problem: Anxiety  Goal: Anxiety is at manageable level  Description: Interventions:  - Assess and monitor patient's anxiety level  - Monitor for signs and symptoms (heart palpitations, chest pain, shortness of breath, headaches, nausea, feeling jumpy, restlessness, irritable, apprehensive)  - Collaborate with interdisciplinary team and initiate plan and interventions as ordered    - [place] patient to unit/surroundings  - Explain treatment plan  - Encourage participation in care  - Encourage verbalization of concerns/fears  - Identify coping mechanisms  - Assist in developing anxiety-reducing skills  - Administer/offer alternative therapies  - Limit or eliminate stimulants  Outcome: Progressing     Problem: SLEEP DISTURBANCE  Goal: Will exhibit normal sleeping pattern  Description: Interventions:  -  Assess the patients sleep pattern, noting recent changes  - Administer medication as ordered  - Decrease environmental stimuli, including noise, as appropriate during the night  - Encourage the patient to actively participate in unit groups and or exercise during the day to enhance ability to achieve adequate sleep at night  - Assess the patient, in the morning, encouraging a description of sleep experience  Outcome: Progressing

## 2022-11-17 NOTE — NURSING NOTE
Patient appears to have slept overnight without periods of restlessness observed  Patient appears to still be sleeping now  Q7 minute observational rounds maintained for promotion of patient safety

## 2022-11-17 NOTE — PROGRESS NOTES
Status: Pt is up & about early this morning  She is pleasant & social with peers, attending groups  Pt denies any SI/HI  Pt appeared to have slept overnight  Medication: Trazodone 50mg HS started / PRN - Tylenol   D/C: Friday(?) / Pt has outpatient through [facility] & agreed with an ICM referral       11/17/22 0830   Team Meeting   Meeting Type Daily Rounds   Team Members Present   Team Members Present Physician;Nurse;; Other (Discipline and Name)   Physician Team Member Dr Stephanie Bailey / Fatemeh Agarwal / Christos Lopez Team Member [facility] Management Team Member Vee Ennis / Stevie Mortensen   Other (Discipline and Name) [place](art therapy)   Patient/Family Present   Patient Present No   Patient's Family Present No

## 2022-11-17 NOTE — CASE MANAGEMENT
CM contacted [facility] @ [phone] opt 1 & left a message seeking a return phone call to schedule Pt's follow-up appointments, as she was discharging tomorrow  CM contacted Pt's mom, Ruddy Daly @ [phone] & provided an update  CM reviewed d/c plans for tomorrow & she confirmed that Pt's boyfriend would be picking her up  She had no questions & CM provided her direct phone number if she had questions later  CM completed & faxed a TCM referral to [facility]  @ [phone]

## 2022-11-17 NOTE — NURSING NOTE
Pt visible and social with peers throughout the day  Looking forward to possible DC on Friday  Happy to be starting Trazodone HS, hopeful for restful sleep  Denies SI/HI/AVH  Pleasant and cooperative

## 2022-11-17 NOTE — NURSING NOTE
Pt denies SI/HI, social with peers on the unit  Pt states feeling as thought her mood has been improving and denies any questions at this time  Pt expresses feeling happy that she will be discharging tomorrow and feels readiness for this  Has been attending group

## 2022-11-17 NOTE — TREATMENT TEAM
11/17/22 1230   Activity/Group Checklist   Group Life Skills   Attendance Attended   Attendance Duration (min) 16-30   Interactions Interacted appropriately   Affect/Mood Appropriate   Goals Achieved Identified feelings; Able to listen to others; Able to engage in interactions; Able to recieve feedback; Able to give feedback to another

## 2022-11-18 VITALS
DIASTOLIC BLOOD PRESSURE: 83 MMHG | SYSTOLIC BLOOD PRESSURE: 133 MMHG | WEIGHT: 122.2 LBS | RESPIRATION RATE: 18 BRPM | HEART RATE: 77 BPM | OXYGEN SATURATION: 100 % | TEMPERATURE: 97 F | HEIGHT: 58 IN | BODY MASS INDEX: 25.65 KG/M2

## 2022-11-18 RX ORDER — VENLAFAXINE HYDROCHLORIDE 75 MG/1
75 CAPSULE, EXTENDED RELEASE ORAL DAILY
Qty: 30 CAPSULE | Refills: 1 | Status: SHIPPED | OUTPATIENT
Start: 2022-11-18 | End: 2023-01-17

## 2022-11-18 RX ORDER — HYDROXYZINE HYDROCHLORIDE 25 MG/1
25 TABLET, FILM COATED ORAL EVERY 6 HOURS PRN
Qty: 20 TABLET | Refills: 0 | Status: SHIPPED | OUTPATIENT
Start: 2022-11-18

## 2022-11-18 RX ORDER — TRAZODONE HYDROCHLORIDE 50 MG/1
50 TABLET ORAL
Qty: 30 TABLET | Refills: 1 | Status: SHIPPED | OUTPATIENT
Start: 2022-11-18 | End: 2023-01-17

## 2022-11-18 RX ORDER — LANOLIN ALCOHOL/MO/W.PET/CERES
3 CREAM (GRAM) TOPICAL
Qty: 30 TABLET | Refills: 1 | Status: SHIPPED | OUTPATIENT
Start: 2022-11-18 | End: 2023-01-17

## 2022-11-18 RX ORDER — ARIPIPRAZOLE 5 MG/1
5 TABLET ORAL DAILY
Qty: 30 TABLET | Refills: 1 | Status: SHIPPED | OUTPATIENT
Start: 2022-11-18 | End: 2023-01-17

## 2022-11-18 RX ADMIN — BACITRACIN ZINC, NEOMYCIN, POLYMYXIN B 1 SMALL APPLICATION: 400; 3.5; 5 OINTMENT TOPICAL at 09:20

## 2022-11-18 RX ADMIN — NICOTINE POLACRILEX 2 MG: 4 GUM, CHEWING BUCCAL at 09:30

## 2022-11-18 RX ADMIN — VENLAFAXINE HYDROCHLORIDE 75 MG: 75 CAPSULE, EXTENDED RELEASE ORAL at 09:20

## 2022-11-18 RX ADMIN — ARIPIPRAZOLE 5 MG: 5 TABLET ORAL at 09:20

## 2022-11-18 RX ADMIN — NICOTINE POLACRILEX 2 MG: 4 GUM, CHEWING BUCCAL at 03:08

## 2022-11-18 NOTE — NURSING NOTE
AVS discharge instructions reviewed with patient  Patient denies any questions and concerns  Expresses readiness for discharge  Patient discharge from the unit pleasant and calm

## 2022-11-18 NOTE — TREATMENT TEAM
11/18/22 1000   Activity/Group Checklist   Group Community meeting   Attendance Attended   Attendance Duration (min) 16-30   Interactions Interacted appropriately   Affect/Mood Appropriate   Goals Achieved Able to listen to others; Able to engage in interactions; Other (Comment)  (identified goals for the day)

## 2022-11-18 NOTE — CASE MANAGEMENT
CM received a phone call from June, at [place] [id number] who asked if Pt had been discharged yet  CM confirmed with nursing staff that Pt was still on the unit & transferred the call so she could talk directly to Pt

## 2022-11-18 NOTE — NURSING NOTE
Pt remains pleasant and calm  Denies SI/HI, reports mood has improved since admission and expresses feeling readiness for discharge  Pt social with peers, denies any questions

## 2022-11-18 NOTE — PROGRESS NOTES
Status: Pt is alert & cooperative, & visible in the milieu  Pt denies any SI/HI or hallucinations  Pt was restless overnight but stayed in her room  Medication: Effexor was increased to 75mg daily / no PRNs  D/C: Today - Pt's boyfriend will provide transportation around 10/11 AM   Pt referred to [facility] for TCM services & has outpatient at [facility]; CM is waiting for a return call to schedule appts for Pt      11/18/22 0750   Team Meeting   Meeting Type Daily Rounds   Team Members Present   Team Members Present Nurse; Other (Discipline and Name); Physician;   Physician Team Member Dr Robert Hebert / Jorge Silveira / Ki Abreu Team Member [facility] Management Team Member Kayy Casarez / Jose Bundy   Other (Discipline and Name) [place](art therapy)   Patient/Family Present   Patient Present No   Patient's Family Present No

## 2022-11-18 NOTE — NURSING NOTE
Patient presents alert, cooperative and intermittently visible throughout the community  Bright affect when approached  Patient denies SI/HI/SIB and A/VH at this time  No depression or anxiety noted  No pain  VS WNL  No signs or symptoms of COVID-19  Patient able to utilize coping skills to cope with any negative thoughts or feelings  Patient is compliant with medication administration without prompting  Maintained on safe precautions without incident  Staff provides therapeutic support, positive encouragement, and a structured routine  Patient reports feeling ready for d/c tomorrow

## 2022-11-18 NOTE — PROGRESS NOTES
AUDIT: 3, PAWSS: 0, Ethanol <3, UDS:  +benzo & THC    Pt minimized EtOH use, stating she sometimes has a drink socially, or on special occassions  Pt reported she does smoke marijuana, usually twice/day; she has a medical card  She began using [facility] when she was [age] y/o  Pt also vapes nicotine; she began smoking cigarettes when she was 17 y/o  Pt receives therapy & will do medication management as well, through [facility]  She was referred to [facility]  for [employer]

## 2022-11-18 NOTE — PLAN OF CARE
Problem: DISCHARGE PLANNING  Goal: Discharge to home or other facility with appropriate resources  Description: INTERVENTIONS:  - Identify barriers to discharge w/patient and caregiver  - Arrange for needed discharge resources and transportation as appropriate  - Identify discharge learning needs (meds, wound care, etc )  - Arrange for interpretive services to assist at discharge as needed  - Refer to Case Management Department for coordinating discharge planning if the patient needs post-hospital services based on physician/advanced practitioner order or complex needs related to functional status, cognitive ability, or social support system  Outcome: Adequate for Discharge  Note: D/C is planned for tomorrow  Pt was referred to [facility]  for [employer]  CM is waiting for a call back from [facility] and [facility] to schedule Pt's next appointments for therapy/med management

## 2022-11-18 NOTE — PLAN OF CARE
Problem: Ineffective Coping  Goal: Identifies healthy coping skills  Outcome: Adequate for Discharge  Goal: Participates in unit activities  Description: Interventions:  - Provide therapeutic environment   - Provide required programming   - Redirect inappropriate behaviors   Outcome: Adequate for Discharge     Problem: Risk for Self Injury/Neglect  Goal: Verbalize thoughts and feelings  Description: Interventions:  - Assess and re-assess patient's lethality and potential for self-injury  - Engage patient in 1:1 interactions, daily, for a minimum of 15 minutes  - Encourage patient to express feelings, fears, frustrations, hopes  - Establish rapport/trust with patient   Outcome: Adequate for Discharge  Goal: Refrain from harming self  Description: Interventions:  - Monitor patient closely, per order  - Develop a trusting relationship  - Supervise medication ingestion, monitor effects and side effects   Outcome: Adequate for Discharge     Problem: Depression  Goal: Treatment Goal: Demonstrate behavioral control of depressive symptoms, verbalize feelings of improved mood/affect, and adopt new coping skills prior to discharge  Outcome: Adequate for Discharge  Goal: Refrain from isolation  Description: Interventions:  - Develop a trusting relationship   - Encourage socialization   Outcome: Adequate for Discharge     Problem: Anxiety  Goal: Anxiety is at manageable level  Description: Interventions:  - Assess and monitor patient's anxiety level  - Monitor for signs and symptoms (heart palpitations, chest pain, shortness of breath, headaches, nausea, feeling jumpy, restlessness, irritable, apprehensive)  - Collaborate with interdisciplinary team and initiate plan and interventions as ordered    - [place] patient to unit/surroundings  - Explain treatment plan  - Encourage participation in care  - Encourage verbalization of concerns/fears  - Identify coping mechanisms  - Assist in developing anxiety-reducing skills  - Administer/offer alternative therapies  - Limit or eliminate stimulants  Outcome: Adequate for Discharge     Problem: SLEEP DISTURBANCE  Goal: Will exhibit normal sleeping pattern  Description: Interventions:  -  Assess the patients sleep pattern, noting recent changes  - Administer medication as ordered  - Decrease environmental stimuli, including noise, as appropriate during the night  - Encourage the patient to actively participate in unit groups and or exercise during the day to enhance ability to achieve adequate sleep at night  - Assess the patient, in the morning, encouraging a description of sleep experience  Outcome: Adequate for Discharge     Problem: DISCHARGE PLANNING  Goal: Discharge to home or other facility with appropriate resources  Description: INTERVENTIONS:  - Identify barriers to discharge w/patient and caregiver  - Arrange for needed discharge resources and transportation as appropriate  - Identify discharge learning needs (meds, wound care, etc )  - Arrange for interpretive services to assist at discharge as needed  - Refer to Case Management Department for coordinating discharge planning if the patient needs post-hospital services based on physician/advanced practitioner order or complex needs related to functional status, cognitive ability, or social support system  Outcome: Adequate for Discharge

## 2022-11-18 NOTE — PLAN OF CARE
Problem: Risk for Self Injury/Neglect  Goal: Verbalize thoughts and feelings  Description: Interventions:  - Assess and re-assess patient's lethality and potential for self-injury  - Engage patient in 1:1 interactions, daily, for a minimum of 15 minutes  - Encourage patient to express feelings, fears, frustrations, hopes  - Establish rapport/trust with patient   Outcome: Progressing  Goal: Refrain from harming self  Description: Interventions:  - Monitor patient closely, per order  - Develop a trusting relationship  - Supervise medication ingestion, monitor effects and side effects   Outcome: Progressing     Problem: Depression  Goal: Treatment Goal: Demonstrate behavioral control of depressive symptoms, verbalize feelings of improved mood/affect, and adopt new coping skills prior to discharge  Outcome: Progressing  Goal: Refrain from isolation  Description: Interventions:  - Develop a trusting relationship   - Encourage socialization   Outcome: Progressing     Problem: Anxiety  Goal: Anxiety is at manageable level  Description: Interventions:  - Assess and monitor patient's anxiety level  - Monitor for signs and symptoms (heart palpitations, chest pain, shortness of breath, headaches, nausea, feeling jumpy, restlessness, irritable, apprehensive)  - Collaborate with interdisciplinary team and initiate plan and interventions as ordered    - [place] patient to unit/surroundings  - Explain treatment plan  - Encourage participation in care  - Encourage verbalization of concerns/fears  - Identify coping mechanisms  - Assist in developing anxiety-reducing skills  - Administer/offer alternative therapies  - Limit or eliminate stimulants  Outcome: Progressing     Problem: SLEEP DISTURBANCE  Goal: Will exhibit normal sleeping pattern  Description: Interventions:  -  Assess the patients sleep pattern, noting recent changes  - Administer medication as ordered  - Decrease environmental stimuli, including noise, as appropriate during the night  - Encourage the patient to actively participate in unit groups and or exercise during the day to enhance ability to achieve adequate sleep at night  - Assess the patient, in the morning, encouraging a description of sleep experience  Outcome: Progressing

## 2022-11-18 NOTE — NURSING NOTE
Staff reports that patient was restless throughout the shift, but she remained cooperative in her room

## 2024-04-10 ENCOUNTER — APPOINTMENT (OUTPATIENT)
Dept: LAB | Facility: CLINIC | Age: 24
End: 2024-04-10
Payer: COMMERCIAL

## 2024-04-10 DIAGNOSIS — F31.9 BIPOLAR AFFECTIVE DISORDER, REMISSION STATUS UNSPECIFIED (HCC): ICD-10-CM

## 2024-04-10 LAB
ATRIAL RATE: 87 BPM
P AXIS: 12 DEGREES
PR INTERVAL: 150 MS
QRS AXIS: 62 DEGREES
QRSD INTERVAL: 66 MS
QT INTERVAL: 340 MS
QTC INTERVAL: 409 MS
T WAVE AXIS: 35 DEGREES
VENTRICULAR RATE: 87 BPM

## 2024-04-10 PROCEDURE — 93010 ELECTROCARDIOGRAM REPORT: CPT | Performed by: INTERNAL MEDICINE

## 2025-01-13 ENCOUNTER — APPOINTMENT (OUTPATIENT)
Dept: LAB | Facility: CLINIC | Age: 25
End: 2025-01-13
Payer: COMMERCIAL

## 2025-01-13 DIAGNOSIS — F31.9 BIPOLAR AFFECTIVE DISORDER, REMISSION STATUS UNSPECIFIED (HCC): ICD-10-CM

## 2025-01-13 LAB
ALBUMIN SERPL BCG-MCNC: 4 G/DL (ref 3.5–5)
ALP SERPL-CCNC: 99 U/L (ref 34–104)
ALT SERPL W P-5'-P-CCNC: 24 U/L (ref 7–52)
ANION GAP SERPL CALCULATED.3IONS-SCNC: 7 MMOL/L (ref 4–13)
AST SERPL W P-5'-P-CCNC: 20 U/L (ref 13–39)
BASOPHILS # BLD AUTO: 0.06 THOUSANDS/ΜL (ref 0–0.1)
BASOPHILS NFR BLD AUTO: 1 % (ref 0–1)
BILIRUB DIRECT SERPL-MCNC: 0.03 MG/DL (ref 0–0.2)
BILIRUB SERPL-MCNC: 0.26 MG/DL (ref 0.2–1)
BUN SERPL-MCNC: 14 MG/DL (ref 5–25)
CALCIUM SERPL-MCNC: 9 MG/DL (ref 8.4–10.2)
CHLORIDE SERPL-SCNC: 106 MMOL/L (ref 96–108)
CHOLEST SERPL-MCNC: 169 MG/DL (ref ?–200)
CO2 SERPL-SCNC: 25 MMOL/L (ref 21–32)
CREAT SERPL-MCNC: 0.68 MG/DL (ref 0.6–1.3)
EOSINOPHIL # BLD AUTO: 0.29 THOUSAND/ΜL (ref 0–0.61)
EOSINOPHIL NFR BLD AUTO: 2 % (ref 0–6)
ERYTHROCYTE [DISTWIDTH] IN BLOOD BY AUTOMATED COUNT: 14.6 % (ref 11.6–15.1)
EST. AVERAGE GLUCOSE BLD GHB EST-MCNC: 120 MG/DL
GFR SERPL CREATININE-BSD FRML MDRD: 124 ML/MIN/1.73SQ M
GLUCOSE P FAST SERPL-MCNC: 80 MG/DL (ref 65–99)
HBA1C MFR BLD: 5.8 %
HCT VFR BLD AUTO: 39.6 % (ref 34.8–46.1)
HDLC SERPL-MCNC: 37 MG/DL
HGB BLD-MCNC: 12.5 G/DL (ref 11.5–15.4)
IMM GRANULOCYTES # BLD AUTO: 0.04 THOUSAND/UL (ref 0–0.2)
IMM GRANULOCYTES NFR BLD AUTO: 0 % (ref 0–2)
LDLC SERPL CALC-MCNC: 111 MG/DL (ref 0–100)
LYMPHOCYTES # BLD AUTO: 3.57 THOUSANDS/ΜL (ref 0.6–4.47)
LYMPHOCYTES NFR BLD AUTO: 29 % (ref 14–44)
MCH RBC QN AUTO: 26.9 PG (ref 26.8–34.3)
MCHC RBC AUTO-ENTMCNC: 31.6 G/DL (ref 31.4–37.4)
MCV RBC AUTO: 85 FL (ref 82–98)
MONOCYTES # BLD AUTO: 0.56 THOUSAND/ΜL (ref 0.17–1.22)
MONOCYTES NFR BLD AUTO: 5 % (ref 4–12)
NEUTROPHILS # BLD AUTO: 7.79 THOUSANDS/ΜL (ref 1.85–7.62)
NEUTS SEG NFR BLD AUTO: 63 % (ref 43–75)
NONHDLC SERPL-MCNC: 132 MG/DL
NRBC BLD AUTO-RTO: 0 /100 WBCS
PLATELET # BLD AUTO: 436 THOUSANDS/UL (ref 149–390)
PMV BLD AUTO: 9.2 FL (ref 8.9–12.7)
POTASSIUM SERPL-SCNC: 4.1 MMOL/L (ref 3.5–5.3)
PROT SERPL-MCNC: 7 G/DL (ref 6.4–8.4)
RBC # BLD AUTO: 4.65 MILLION/UL (ref 3.81–5.12)
SODIUM SERPL-SCNC: 138 MMOL/L (ref 135–147)
T3 SERPL-MCNC: 1.4 NG/ML (ref 0.9–1.8)
T4 SERPL-MCNC: 7.89 UG/DL (ref 6.09–12.23)
TRIGL SERPL-MCNC: 104 MG/DL (ref ?–150)
TSH SERPL DL<=0.05 MIU/L-ACNC: 0.5 UIU/ML (ref 0.45–4.5)
WBC # BLD AUTO: 12.31 THOUSAND/UL (ref 4.31–10.16)

## 2025-01-13 PROCEDURE — 84436 ASSAY OF TOTAL THYROXINE: CPT

## 2025-01-13 PROCEDURE — 84480 ASSAY TRIIODOTHYRONINE (T3): CPT

## 2025-01-13 PROCEDURE — 80053 COMPREHEN METABOLIC PANEL: CPT

## 2025-01-13 PROCEDURE — 85025 COMPLETE CBC W/AUTO DIFF WBC: CPT

## 2025-01-13 PROCEDURE — 82248 BILIRUBIN DIRECT: CPT

## 2025-01-13 PROCEDURE — 80061 LIPID PANEL: CPT

## 2025-01-13 PROCEDURE — 36415 COLL VENOUS BLD VENIPUNCTURE: CPT

## 2025-01-13 PROCEDURE — 84443 ASSAY THYROID STIM HORMONE: CPT

## 2025-01-13 PROCEDURE — 83036 HEMOGLOBIN GLYCOSYLATED A1C: CPT

## 2025-02-11 ENCOUNTER — APPOINTMENT (OUTPATIENT)
Dept: URGENT CARE | Facility: CLINIC | Age: 25
End: 2025-02-11
Payer: OTHER MISCELLANEOUS

## 2025-02-11 PROCEDURE — 99283 EMERGENCY DEPT VISIT LOW MDM: CPT

## 2025-02-11 PROCEDURE — G0382 LEV 3 HOSP TYPE B ED VISIT: HCPCS

## 2025-02-14 ENCOUNTER — APPOINTMENT (OUTPATIENT)
Dept: URGENT CARE | Facility: CLINIC | Age: 25
End: 2025-02-14
Payer: OTHER MISCELLANEOUS

## 2025-02-14 PROCEDURE — 99213 OFFICE O/P EST LOW 20 MIN: CPT | Performed by: FAMILY MEDICINE

## 2025-07-15 ENCOUNTER — OFFICE VISIT (OUTPATIENT)
Dept: URGENT CARE | Facility: CLINIC | Age: 25
End: 2025-07-15
Payer: COMMERCIAL

## 2025-07-15 VITALS
OXYGEN SATURATION: 99 % | WEIGHT: 165 LBS | SYSTOLIC BLOOD PRESSURE: 106 MMHG | HEIGHT: 61 IN | HEART RATE: 103 BPM | BODY MASS INDEX: 31.15 KG/M2 | DIASTOLIC BLOOD PRESSURE: 64 MMHG | TEMPERATURE: 96.3 F | RESPIRATION RATE: 18 BRPM

## 2025-07-15 DIAGNOSIS — B34.9 VIRAL SYNDROME: Primary | ICD-10-CM

## 2025-07-15 LAB — SL AMB POCT URINE HCG: NEGATIVE

## 2025-07-15 PROCEDURE — S9088 SERVICES PROVIDED IN URGENT: HCPCS | Performed by: PHYSICIAN ASSISTANT

## 2025-07-15 PROCEDURE — 99213 OFFICE O/P EST LOW 20 MIN: CPT | Performed by: PHYSICIAN ASSISTANT

## 2025-07-15 PROCEDURE — 81025 URINE PREGNANCY TEST: CPT | Performed by: PHYSICIAN ASSISTANT

## 2025-07-15 RX ORDER — ONDANSETRON 4 MG/1
4 TABLET, ORALLY DISINTEGRATING ORAL EVERY 6 HOURS PRN
Qty: 20 TABLET | Refills: 0 | Status: SHIPPED | OUTPATIENT
Start: 2025-07-15